# Patient Record
Sex: FEMALE | Race: OTHER | NOT HISPANIC OR LATINO | URBAN - METROPOLITAN AREA
[De-identification: names, ages, dates, MRNs, and addresses within clinical notes are randomized per-mention and may not be internally consistent; named-entity substitution may affect disease eponyms.]

---

## 2017-04-06 ENCOUNTER — OUTPATIENT (OUTPATIENT)
Dept: OUTPATIENT SERVICES | Facility: HOSPITAL | Age: 27
LOS: 1 days | Discharge: HOME | End: 2017-04-06
Payer: COMMERCIAL

## 2017-04-06 ENCOUNTER — APPOINTMENT (OUTPATIENT)
Dept: MRI IMAGING | Facility: HOSPITAL | Age: 27
End: 2017-04-06

## 2017-04-06 DIAGNOSIS — R22.41 LOCALIZED SWELLING, MASS AND LUMP, RIGHT LOWER LIMB: ICD-10-CM

## 2017-04-06 PROCEDURE — 73720 MRI LWR EXTREMITY W/O&W/DYE: CPT | Mod: 26,RT

## 2017-04-11 ENCOUNTER — RESULT REVIEW (OUTPATIENT)
Age: 27
End: 2017-04-11

## 2019-04-25 ENCOUNTER — EMERGENCY (EMERGENCY)
Facility: HOSPITAL | Age: 29
LOS: 0 days | Discharge: HOME | End: 2019-04-25
Payer: COMMERCIAL

## 2019-04-25 VITALS
DIASTOLIC BLOOD PRESSURE: 86 MMHG | RESPIRATION RATE: 18 BRPM | WEIGHT: 195.99 LBS | OXYGEN SATURATION: 99 % | SYSTOLIC BLOOD PRESSURE: 130 MMHG | HEIGHT: 61 IN | TEMPERATURE: 98 F | HEART RATE: 97 BPM

## 2019-04-25 DIAGNOSIS — N93.8 OTHER SPECIFIED ABNORMAL UTERINE AND VAGINAL BLEEDING: ICD-10-CM

## 2019-04-25 DIAGNOSIS — Z3A.01 LESS THAN 8 WEEKS GESTATION OF PREGNANCY: ICD-10-CM

## 2019-04-25 DIAGNOSIS — O20.0 THREATENED ABORTION: ICD-10-CM

## 2019-04-25 DIAGNOSIS — Z88.0 ALLERGY STATUS TO PENICILLIN: ICD-10-CM

## 2019-04-25 LAB
ALBUMIN SERPL ELPH-MCNC: 3.5 G/DL — SIGNIFICANT CHANGE UP (ref 3.3–5)
ALP SERPL-CCNC: 88 U/L — SIGNIFICANT CHANGE UP (ref 40–120)
ALT FLD-CCNC: 50 U/L — SIGNIFICANT CHANGE UP (ref 12–78)
ANION GAP SERPL CALC-SCNC: 8 MMOL/L — SIGNIFICANT CHANGE UP (ref 5–17)
APPEARANCE UR: CLEAR — SIGNIFICANT CHANGE UP
APTT BLD: 33 SEC — SIGNIFICANT CHANGE UP (ref 27.5–36.3)
AST SERPL-CCNC: 18 U/L — SIGNIFICANT CHANGE UP (ref 15–37)
BACTERIA # UR AUTO: ABNORMAL
BILIRUB SERPL-MCNC: 0.1 MG/DL — LOW (ref 0.2–1.2)
BILIRUB UR-MCNC: NEGATIVE — SIGNIFICANT CHANGE UP
BUN SERPL-MCNC: 10 MG/DL — SIGNIFICANT CHANGE UP (ref 7–23)
CALCIUM SERPL-MCNC: 9.5 MG/DL — SIGNIFICANT CHANGE UP (ref 8.5–10.1)
CHLORIDE SERPL-SCNC: 107 MMOL/L — SIGNIFICANT CHANGE UP (ref 96–108)
CO2 SERPL-SCNC: 23 MMOL/L — SIGNIFICANT CHANGE UP (ref 22–31)
COLOR SPEC: YELLOW — SIGNIFICANT CHANGE UP
CREAT SERPL-MCNC: 0.64 MG/DL — SIGNIFICANT CHANGE UP (ref 0.5–1.3)
DIFF PNL FLD: NEGATIVE — SIGNIFICANT CHANGE UP
EPI CELLS # UR: SIGNIFICANT CHANGE UP
GLUCOSE SERPL-MCNC: 83 MG/DL — SIGNIFICANT CHANGE UP (ref 70–99)
GLUCOSE UR QL: NEGATIVE MG/DL — SIGNIFICANT CHANGE UP
HCG SERPL-ACNC: HIGH MIU/ML
HCT VFR BLD CALC: 40 % — SIGNIFICANT CHANGE UP (ref 34.5–45)
HGB BLD-MCNC: 13.2 G/DL — SIGNIFICANT CHANGE UP (ref 11.5–15.5)
INR BLD: 1.04 RATIO — SIGNIFICANT CHANGE UP (ref 0.88–1.16)
KETONES UR-MCNC: NEGATIVE — SIGNIFICANT CHANGE UP
LEUKOCYTE ESTERASE UR-ACNC: NEGATIVE — SIGNIFICANT CHANGE UP
LIDOCAIN IGE QN: 106 U/L — SIGNIFICANT CHANGE UP (ref 73–393)
MCHC RBC-ENTMCNC: 29 PG — SIGNIFICANT CHANGE UP (ref 27–34)
MCHC RBC-ENTMCNC: 33 GM/DL — SIGNIFICANT CHANGE UP (ref 32–36)
MCV RBC AUTO: 87.9 FL — SIGNIFICANT CHANGE UP (ref 80–100)
NITRITE UR-MCNC: NEGATIVE — SIGNIFICANT CHANGE UP
NRBC # BLD: 0 /100 WBCS — SIGNIFICANT CHANGE UP (ref 0–0)
PH UR: 6 — SIGNIFICANT CHANGE UP (ref 5–8)
PLATELET # BLD AUTO: 310 K/UL — SIGNIFICANT CHANGE UP (ref 150–400)
POTASSIUM SERPL-MCNC: 3.5 MMOL/L — SIGNIFICANT CHANGE UP (ref 3.5–5.3)
POTASSIUM SERPL-SCNC: 3.5 MMOL/L — SIGNIFICANT CHANGE UP (ref 3.5–5.3)
PROT SERPL-MCNC: 7.4 GM/DL — SIGNIFICANT CHANGE UP (ref 6–8.3)
PROT UR-MCNC: NEGATIVE MG/DL — SIGNIFICANT CHANGE UP
PROTHROM AB SERPL-ACNC: 11.7 SEC — SIGNIFICANT CHANGE UP (ref 10–12.9)
RBC # BLD: 4.55 M/UL — SIGNIFICANT CHANGE UP (ref 3.8–5.2)
RBC # FLD: 13.5 % — SIGNIFICANT CHANGE UP (ref 10.3–14.5)
SODIUM SERPL-SCNC: 138 MMOL/L — SIGNIFICANT CHANGE UP (ref 135–145)
SP GR SPEC: 1.01 — SIGNIFICANT CHANGE UP (ref 1.01–1.02)
UROBILINOGEN FLD QL: NEGATIVE MG/DL — SIGNIFICANT CHANGE UP
WBC # BLD: 15.17 K/UL — HIGH (ref 3.8–10.5)
WBC # FLD AUTO: 15.17 K/UL — HIGH (ref 3.8–10.5)
WBC UR QL: SIGNIFICANT CHANGE UP

## 2019-04-25 PROCEDURE — 76856 US EXAM PELVIC COMPLETE: CPT | Mod: 26

## 2019-04-25 PROCEDURE — 99284 EMERGENCY DEPT VISIT MOD MDM: CPT

## 2019-04-25 NOTE — ED ADULT NURSE NOTE - CHPI ED NUR SYMPTOMS NEG
no back pain/no vomiting/no coffee grounds emesis/no vaginal discharge/no discharge/no abdominal pain/no fever/no nausea

## 2019-04-25 NOTE — ED PROVIDER NOTE - OBJECTIVE STATEMENT
this is a 30 yo F lmp 19 ago  c/o pelvis pain and vaginal spotting x 2 days Pt is prenatal medication

## 2019-04-25 NOTE — ED ADULT TRIAGE NOTE - CHIEF COMPLAINT QUOTE
c/o lower abdominal cramping x 3 weeks vaginal spotting noted x 1 episode last week and again x 1 today 8 weeks pregnant a0

## 2019-04-26 LAB
CULTURE RESULTS: SIGNIFICANT CHANGE UP
SPECIMEN SOURCE: SIGNIFICANT CHANGE UP

## 2019-05-30 ENCOUNTER — APPOINTMENT (OUTPATIENT)
Dept: ANTEPARTUM | Facility: CLINIC | Age: 29
End: 2019-05-30
Payer: COMMERCIAL

## 2019-05-30 ENCOUNTER — ASOB RESULT (OUTPATIENT)
Age: 29
End: 2019-05-30

## 2019-05-30 ENCOUNTER — APPOINTMENT (OUTPATIENT)
Dept: MATERNAL FETAL MEDICINE | Facility: CLINIC | Age: 29
End: 2019-05-30
Payer: COMMERCIAL

## 2019-05-30 PROCEDURE — 76813 OB US NUCHAL MEAS 1 GEST: CPT

## 2019-05-30 PROCEDURE — 99241 OFFICE CONSULTATION NEW/ESTAB PATIENT 15 MIN: CPT

## 2019-05-30 PROCEDURE — 36416 COLLJ CAPILLARY BLOOD SPEC: CPT

## 2019-06-12 LAB
1ST TRIMESTER DATA: NORMAL
ADDENDUM DOC: NORMAL
AFP PNL SERPL: NORMAL
AFP SERPL-ACNC: NORMAL
CLINICAL BIOCHEMIST REVIEW: NORMAL
FREE BETA HCG 1ST TRIMESTER: NORMAL
Lab: NORMAL
NOTES NTD: NORMAL
NT: NORMAL
PAPP-A SERPL-ACNC: NORMAL
TRISOMY 18/3: NORMAL

## 2019-07-02 ENCOUNTER — OUTPATIENT (OUTPATIENT)
Dept: OUTPATIENT SERVICES | Facility: HOSPITAL | Age: 29
LOS: 1 days | Discharge: HOME | End: 2019-07-02
Payer: COMMERCIAL

## 2019-07-02 DIAGNOSIS — I82.401 ACUTE EMBOLISM AND THROMBOSIS OF UNSPECIFIED DEEP VEINS OF RIGHT LOWER EXTREMITY: ICD-10-CM

## 2019-07-02 PROCEDURE — 93971 EXTREMITY STUDY: CPT | Mod: 26,RT

## 2019-07-19 ENCOUNTER — EMERGENCY (EMERGENCY)
Facility: HOSPITAL | Age: 29
LOS: 1 days | Discharge: HOME | End: 2019-07-19
Attending: EMERGENCY MEDICINE | Admitting: EMERGENCY MEDICINE
Payer: SELF-PAY

## 2019-07-19 VITALS
DIASTOLIC BLOOD PRESSURE: 90 MMHG | TEMPERATURE: 98 F | OXYGEN SATURATION: 100 % | HEART RATE: 94 BPM | SYSTOLIC BLOOD PRESSURE: 136 MMHG | RESPIRATION RATE: 18 BRPM

## 2019-07-19 LAB
ALBUMIN SERPL ELPH-MCNC: 3.7 G/DL — SIGNIFICANT CHANGE UP (ref 3.3–5)
ALP SERPL-CCNC: 88 U/L — SIGNIFICANT CHANGE UP (ref 40–120)
ALT FLD-CCNC: 26 U/L — SIGNIFICANT CHANGE UP (ref 4–33)
ANION GAP SERPL CALC-SCNC: 10 MMO/L — SIGNIFICANT CHANGE UP (ref 7–14)
APTT BLD: 29.3 SEC — SIGNIFICANT CHANGE UP (ref 27.5–36.3)
AST SERPL-CCNC: 15 U/L — SIGNIFICANT CHANGE UP (ref 4–32)
BASE EXCESS BLDV CALC-SCNC: -1.5 MMOL/L — SIGNIFICANT CHANGE UP
BASOPHILS # BLD AUTO: 0.03 K/UL — SIGNIFICANT CHANGE UP (ref 0–0.2)
BASOPHILS NFR BLD AUTO: 0.2 % — SIGNIFICANT CHANGE UP (ref 0–2)
BILIRUB SERPL-MCNC: < 0.2 MG/DL — LOW (ref 0.2–1.2)
BLOOD GAS VENOUS - CREATININE: 0.53 MG/DL — SIGNIFICANT CHANGE UP (ref 0.5–1.3)
BUN SERPL-MCNC: 9 MG/DL — SIGNIFICANT CHANGE UP (ref 7–23)
CALCIUM SERPL-MCNC: 9.3 MG/DL — SIGNIFICANT CHANGE UP (ref 8.4–10.5)
CHLORIDE BLDV-SCNC: 109 MMOL/L — HIGH (ref 96–108)
CHLORIDE SERPL-SCNC: 105 MMOL/L — SIGNIFICANT CHANGE UP (ref 98–107)
CO2 SERPL-SCNC: 23 MMOL/L — SIGNIFICANT CHANGE UP (ref 22–31)
CREAT SERPL-MCNC: 0.5 MG/DL — SIGNIFICANT CHANGE UP (ref 0.5–1.3)
EOSINOPHIL # BLD AUTO: 0.15 K/UL — SIGNIFICANT CHANGE UP (ref 0–0.5)
EOSINOPHIL NFR BLD AUTO: 1.1 % — SIGNIFICANT CHANGE UP (ref 0–6)
GAS PNL BLDV: 136 MMOL/L — SIGNIFICANT CHANGE UP (ref 136–146)
GLUCOSE BLDV-MCNC: 82 MG/DL — SIGNIFICANT CHANGE UP (ref 70–99)
GLUCOSE SERPL-MCNC: 84 MG/DL — SIGNIFICANT CHANGE UP (ref 70–99)
HCO3 BLDV-SCNC: 23 MMOL/L — SIGNIFICANT CHANGE UP (ref 20–27)
HCT VFR BLD CALC: 38 % — SIGNIFICANT CHANGE UP (ref 34.5–45)
HCT VFR BLDV CALC: 40.1 % — SIGNIFICANT CHANGE UP (ref 34.5–45)
HGB BLD-MCNC: 12.7 G/DL — SIGNIFICANT CHANGE UP (ref 11.5–15.5)
HGB BLDV-MCNC: 13 G/DL — SIGNIFICANT CHANGE UP (ref 11.5–15.5)
IMM GRANULOCYTES NFR BLD AUTO: 0.5 % — SIGNIFICANT CHANGE UP (ref 0–1.5)
INR BLD: 0.97 — SIGNIFICANT CHANGE UP (ref 0.88–1.17)
LACTATE BLDV-MCNC: 0.9 MMOL/L — SIGNIFICANT CHANGE UP (ref 0.5–2)
LYMPHOCYTES # BLD AUTO: 2.96 K/UL — SIGNIFICANT CHANGE UP (ref 1–3.3)
LYMPHOCYTES # BLD AUTO: 22.2 % — SIGNIFICANT CHANGE UP (ref 13–44)
MCHC RBC-ENTMCNC: 30.1 PG — SIGNIFICANT CHANGE UP (ref 27–34)
MCHC RBC-ENTMCNC: 33.4 % — SIGNIFICANT CHANGE UP (ref 32–36)
MCV RBC AUTO: 90 FL — SIGNIFICANT CHANGE UP (ref 80–100)
MONOCYTES # BLD AUTO: 0.7 K/UL — SIGNIFICANT CHANGE UP (ref 0–0.9)
MONOCYTES NFR BLD AUTO: 5.3 % — SIGNIFICANT CHANGE UP (ref 2–14)
NEUTROPHILS # BLD AUTO: 9.4 K/UL — HIGH (ref 1.8–7.4)
NEUTROPHILS NFR BLD AUTO: 70.7 % — SIGNIFICANT CHANGE UP (ref 43–77)
NRBC # FLD: 0 K/UL — SIGNIFICANT CHANGE UP (ref 0–0)
PCO2 BLDV: 38 MMHG — LOW (ref 41–51)
PH BLDV: 7.39 PH — SIGNIFICANT CHANGE UP (ref 7.32–7.43)
PLATELET # BLD AUTO: 276 K/UL — SIGNIFICANT CHANGE UP (ref 150–400)
PMV BLD: 9 FL — SIGNIFICANT CHANGE UP (ref 7–13)
PO2 BLDV: 39 MMHG — SIGNIFICANT CHANGE UP (ref 35–40)
POTASSIUM BLDV-SCNC: 3.5 MMOL/L — SIGNIFICANT CHANGE UP (ref 3.4–4.5)
POTASSIUM SERPL-MCNC: 3.6 MMOL/L — SIGNIFICANT CHANGE UP (ref 3.5–5.3)
POTASSIUM SERPL-SCNC: 3.6 MMOL/L — SIGNIFICANT CHANGE UP (ref 3.5–5.3)
PROT SERPL-MCNC: 6.6 G/DL — SIGNIFICANT CHANGE UP (ref 6–8.3)
PROTHROM AB SERPL-ACNC: 11 SEC — SIGNIFICANT CHANGE UP (ref 9.8–13.1)
RBC # BLD: 4.22 M/UL — SIGNIFICANT CHANGE UP (ref 3.8–5.2)
RBC # FLD: 14.2 % — SIGNIFICANT CHANGE UP (ref 10.3–14.5)
SAO2 % BLDV: 67.6 % — SIGNIFICANT CHANGE UP (ref 60–85)
SODIUM SERPL-SCNC: 138 MMOL/L — SIGNIFICANT CHANGE UP (ref 135–145)
WBC # BLD: 13.31 K/UL — HIGH (ref 3.8–10.5)
WBC # FLD AUTO: 13.31 K/UL — HIGH (ref 3.8–10.5)

## 2019-07-19 PROCEDURE — 99284 EMERGENCY DEPT VISIT MOD MDM: CPT

## 2019-07-19 PROCEDURE — 93971 EXTREMITY STUDY: CPT | Mod: 26,LT

## 2019-07-19 RX ORDER — ACETAMINOPHEN 500 MG
650 TABLET ORAL ONCE
Refills: 0 | Status: COMPLETED | OUTPATIENT
Start: 2019-07-19 | End: 2019-07-19

## 2019-07-19 NOTE — ED PROVIDER NOTE - PROGRESS NOTE DETAILS
Patient was offered an xray as well to rule of stress fracture, risk/benefits were discussed. Pt discussed with co-worker who is in medical field and declined xray at this time. Will continue with labs/US r/o DVT Results of US negative for dvt. Will tx for cellulitis/lymphangitis. Results reviewed and discussed with plan and all question and concerns addressed. Pt has allergy to penicillin. Will dc on clinda. Pt to f/u with [facility] 1-2 days and GYN.

## 2019-07-19 NOTE — ED PROVIDER NOTE - NSFOLLOWUPINSTRUCTIONS_ED_ALL_ED_FT
Patient advised to follow up with PRIMARY CARE DOCTOR IN 1-2 DAYS AND GYNECOLOGIST   and told to return to the emergency department immediately for any new or concerning symptoms such as fevers, chills, worsening redness  OR ANY OTHER COMPLAINTS. Patient agrees with plan.    Take antibiotic as directed   Rest, keep elevated   Apply warm compress to area   Follow up with specialist

## 2019-07-19 NOTE — ED PROVIDER NOTE - ATTENDING CONTRIBUTION TO CARE
29 year old female c/o gradual right foot and leg swelling x few days. PE: NAD, CV RRR, lungs clear, RLE + mild from foot to mid leg swelling, neurovascularly intact. I&P: 29 year old female c/o gradual right foot and leg swelling x few days. PE: NAD, CV RRR, lungs clear, RLE + mild from foot to mid leg swelling, neurovascularly intact. I&P: labs and US unremarkable, peripheral edema, OB/GYN follow up

## 2019-07-19 NOTE — ED PROVIDER NOTE - CARE PLAN
Principal Discharge DX:	Cellulitis  Assessment and plan of treatment:	Patient advised to follow up with PRIMARY CARE DOCTOR IN 1-2 DAYS AND GYNECOLOGIST   and told to return to the emergency department immediately for any new or concerning symptoms such as fevers, chills, worsening redness  OR ANY OTHER COMPLAINTS. Patient agrees with plan.    Take antibiotic as directed   Rest, keep elevated   Apply warm compress to area   Follow up with specialist  Secondary Diagnosis:	Lymphangitis

## 2019-07-19 NOTE — ED PROVIDER NOTE - PHYSICAL EXAMINATION
Vital signs reviewed.   CONSTITUTIONAL: Well-appearing; well-nourished; in no apparent distress. Non-toxic appearing.   HEAD: Normocephalic, atraumatic.  EYES: PERRL, EOM intact, conjunctiva and sclera WNL.  ENT: normal nose; no rhinorrhea;   CARD: Normal S1, S2; no murmurs, rubs, or gallops noted.  RESP: Normal chest excursion with respiration; breath sounds clear and equal bilaterally; no wheezes, rhonchi, or rales.  ABD/GI: soft, non-distended; non-tender;   EXT/MS: moves all extremities; distal pulses are normal, +swelling noted to Rt foot. +TTP over dorsum of foot. +TTP to Rt calf posteriorly. Mild erythema noted to dorsum of foot and some streaking noted to calf medially. Not fluctuant, no signs of cellulitis appreciated. No ecchymosis noted.   SKIN: Normal for age and race; warm; dry; good turgor; See ext section.   NEURO: Awake, alert, oriented x 3, no gross deficits,  no motor or sensory deficit noted. 5/5 strength Noted.   PSYCH: Normal mood; appropriate affect.

## 2019-07-19 NOTE — ED ADULT TRIAGE NOTE - CHIEF COMPLAINT QUOTE
Pt is 20 weeks pregnant, c/o swelling to right foot for the past 2 days, with pain starting yesterday, as well as a faint pink line extending from ankle to shin and to groin. Denies travel. Denies abd pain or other OB complaints.

## 2019-07-19 NOTE — ED PROVIDER NOTE - CLINICAL SUMMARY MEDICAL DECISION MAKING FREE TEXT BOX
Patient is a 29 y.o female with no significant PMHx who presents to ED c/o RLE foot pain and swelling x 2-3 days. DDx- includes but not limited to; r/o thrombophlebitis. Plan- labs, Doppler, will monitor and reassess

## 2019-07-19 NOTE — ED PROVIDER NOTE - OBJECTIVE STATEMENT
HPI: Patient is a 29 y.o female with no significant PMHx who presents to ED c/o RLE foot pain and swelling x 2-3 days. As per patient states about 3 days ago began to notice swelling of Rt foot with some pain with ambulating. Patient then noticed today area of redness to dorsum of foot and noticed some streaking up to calf and toward thigh. Admits to pain to foot and calf, pain exacerbated with ambulating. Admits to taking tylenol this morning with minimal relief. Pt admits she had similar swelling of foot 2 weeks ago. Denies known insect bites or injuries/wounds, denies weakness, recent travel, hx of DVT, SOB, CP, weakness, decreased rom, ankle pain, trauma/falls, fevers/chills or any other complaints. Denies hx of smoking. Denies ob complaints. No vaginal bleeding, abdominal pain, dysuria, n/v/d, back pain or any other complaints.

## 2019-07-23 ENCOUNTER — INPATIENT (INPATIENT)
Facility: HOSPITAL | Age: 29
LOS: 5 days | Discharge: HOME | End: 2019-07-29
Attending: INTERNAL MEDICINE | Admitting: INTERNAL MEDICINE
Payer: COMMERCIAL

## 2019-07-23 VITALS
DIASTOLIC BLOOD PRESSURE: 80 MMHG | RESPIRATION RATE: 17 BRPM | TEMPERATURE: 98 F | OXYGEN SATURATION: 100 % | WEIGHT: 205.91 LBS | HEART RATE: 115 BPM | SYSTOLIC BLOOD PRESSURE: 150 MMHG

## 2019-07-23 LAB
ALBUMIN SERPL ELPH-MCNC: 3.6 G/DL — SIGNIFICANT CHANGE UP (ref 3.3–5)
ALP SERPL-CCNC: 84 U/L — SIGNIFICANT CHANGE UP (ref 40–120)
ALT FLD-CCNC: 19 U/L — SIGNIFICANT CHANGE UP (ref 4–33)
ANION GAP SERPL CALC-SCNC: 10 MMO/L — SIGNIFICANT CHANGE UP (ref 7–14)
AST SERPL-CCNC: 14 U/L — SIGNIFICANT CHANGE UP (ref 4–32)
BASOPHILS # BLD AUTO: 0.04 K/UL — SIGNIFICANT CHANGE UP (ref 0–0.2)
BASOPHILS NFR BLD AUTO: 0.3 % — SIGNIFICANT CHANGE UP (ref 0–2)
BILIRUB SERPL-MCNC: < 0.2 MG/DL — LOW (ref 0.2–1.2)
BUN SERPL-MCNC: 9 MG/DL — SIGNIFICANT CHANGE UP (ref 7–23)
CALCIUM SERPL-MCNC: 9 MG/DL — SIGNIFICANT CHANGE UP (ref 8.4–10.5)
CHLORIDE SERPL-SCNC: 107 MMOL/L — SIGNIFICANT CHANGE UP (ref 98–107)
CO2 SERPL-SCNC: 20 MMOL/L — LOW (ref 22–31)
CREAT SERPL-MCNC: 0.49 MG/DL — LOW (ref 0.5–1.3)
EOSINOPHIL # BLD AUTO: 0.08 K/UL — SIGNIFICANT CHANGE UP (ref 0–0.5)
EOSINOPHIL NFR BLD AUTO: 0.6 % — SIGNIFICANT CHANGE UP (ref 0–6)
GLUCOSE SERPL-MCNC: 82 MG/DL — SIGNIFICANT CHANGE UP (ref 70–99)
HCT VFR BLD CALC: 40.4 % — SIGNIFICANT CHANGE UP (ref 34.5–45)
HGB BLD-MCNC: 13.1 G/DL — SIGNIFICANT CHANGE UP (ref 11.5–15.5)
IMM GRANULOCYTES NFR BLD AUTO: 0.5 % — SIGNIFICANT CHANGE UP (ref 0–1.5)
LYMPHOCYTES # BLD AUTO: 1.99 K/UL — SIGNIFICANT CHANGE UP (ref 1–3.3)
LYMPHOCYTES # BLD AUTO: 14.1 % — SIGNIFICANT CHANGE UP (ref 13–44)
MCHC RBC-ENTMCNC: 28.8 PG — SIGNIFICANT CHANGE UP (ref 27–34)
MCHC RBC-ENTMCNC: 32.4 % — SIGNIFICANT CHANGE UP (ref 32–36)
MCV RBC AUTO: 88.8 FL — SIGNIFICANT CHANGE UP (ref 80–100)
MONOCYTES # BLD AUTO: 0.5 K/UL — SIGNIFICANT CHANGE UP (ref 0–0.9)
MONOCYTES NFR BLD AUTO: 3.5 % — SIGNIFICANT CHANGE UP (ref 2–14)
NEUTROPHILS # BLD AUTO: 11.47 K/UL — HIGH (ref 1.8–7.4)
NEUTROPHILS NFR BLD AUTO: 81 % — HIGH (ref 43–77)
NRBC # FLD: 0 K/UL — SIGNIFICANT CHANGE UP (ref 0–0)
PLATELET # BLD AUTO: 263 K/UL — SIGNIFICANT CHANGE UP (ref 150–400)
PMV BLD: 9 FL — SIGNIFICANT CHANGE UP (ref 7–13)
POTASSIUM SERPL-MCNC: 3.8 MMOL/L — SIGNIFICANT CHANGE UP (ref 3.5–5.3)
POTASSIUM SERPL-SCNC: 3.8 MMOL/L — SIGNIFICANT CHANGE UP (ref 3.5–5.3)
PROT SERPL-MCNC: 6.9 G/DL — SIGNIFICANT CHANGE UP (ref 6–8.3)
RBC # BLD: 4.55 M/UL — SIGNIFICANT CHANGE UP (ref 3.8–5.2)
RBC # FLD: 14.4 % — SIGNIFICANT CHANGE UP (ref 10.3–14.5)
SODIUM SERPL-SCNC: 137 MMOL/L — SIGNIFICANT CHANGE UP (ref 135–145)
WBC # BLD: 14.15 K/UL — HIGH (ref 3.8–10.5)
WBC # FLD AUTO: 14.15 K/UL — HIGH (ref 3.8–10.5)

## 2019-07-23 RX ORDER — SODIUM CHLORIDE 9 MG/ML
1000 INJECTION INTRAMUSCULAR; INTRAVENOUS; SUBCUTANEOUS ONCE
Refills: 0 | Status: COMPLETED | OUTPATIENT
Start: 2019-07-23 | End: 2019-07-23

## 2019-07-23 RX ORDER — DAPTOMYCIN 500 MG/10ML
373 INJECTION, POWDER, LYOPHILIZED, FOR SOLUTION INTRAVENOUS EVERY 24 HOURS
Refills: 0 | Status: COMPLETED | OUTPATIENT
Start: 2019-07-23 | End: 2019-07-24

## 2019-07-23 RX ORDER — ACETAMINOPHEN 500 MG
975 TABLET ORAL ONCE
Refills: 0 | Status: COMPLETED | OUTPATIENT
Start: 2019-07-23 | End: 2019-07-23

## 2019-07-23 RX ORDER — VANCOMYCIN HCL 1 G
1000 VIAL (EA) INTRAVENOUS EVERY 12 HOURS
Refills: 0 | Status: DISCONTINUED | OUTPATIENT
Start: 2019-07-23 | End: 2019-07-23

## 2019-07-23 RX ADMIN — Medication 250 MILLIGRAM(S): at 10:17

## 2019-07-23 RX ADMIN — Medication 975 MILLIGRAM(S): at 18:38

## 2019-07-23 RX ADMIN — SODIUM CHLORIDE 1000 MILLILITER(S): 9 INJECTION INTRAMUSCULAR; INTRAVENOUS; SUBCUTANEOUS at 09:20

## 2019-07-23 RX ADMIN — Medication 975 MILLIGRAM(S): at 18:08

## 2019-07-23 RX ADMIN — DAPTOMYCIN 114.92 MILLIGRAM(S): 500 INJECTION, POWDER, LYOPHILIZED, FOR SOLUTION INTRAVENOUS at 19:28

## 2019-07-23 RX ADMIN — DAPTOMYCIN 373 MILLIGRAM(S): 500 INJECTION, POWDER, LYOPHILIZED, FOR SOLUTION INTRAVENOUS at 20:00

## 2019-07-23 NOTE — ED ADULT NURSE NOTE - OBJECTIVE STATEMENT
Pt presenting to intake AxOx4 ambulatory at baseline with c.o redness and warmth to right leg. Pt 20 weeks pregnant. Pt states she was discharged last Friday, started on abx for what started just as pain in RLE without redness. Pt states yesterday she developed warmth and redness to right leg, extending from ankle to hip. Pt denies CP, SOB, dizziness, lightheadedness, fever, body aches, palpitations. Right leg appears warm to touch on site of redness, pt c/o worsening pain with ambulation. Pt noted to be tachycardic at 111. BP stable, pt's breathing is even and unlabored. IV established with 20g in RAC, labs drawn and sent, MD at bedside, will continue to monitor.

## 2019-07-23 NOTE — ED ADULT TRIAGE NOTE - CHIEF COMPLAINT QUOTE
20 w preg. recently treated for a Right leg infection, states wound pattern is extending upward and warm to touch.

## 2019-07-23 NOTE — ED CDU PROVIDER INITIAL DAY NOTE - GASTROINTESTINAL, MLM
Abdomen soft, non-tender, no rebound, no guarding. Gravid. Fundus palpable to the level of the umbilicus.

## 2019-07-23 NOTE — ED CDU PROVIDER INITIAL DAY NOTE - PHYSICAL EXAMINATION
Raised erythematous, linear streak noted from the medial shin into the medial thigh, +tenderness and warmth on palpation, no surrounding erythema

## 2019-07-23 NOTE — CONSULT NOTE ADULT - ASSESSMENT
28 yo female presents with pregnancy and right leg lymphangitis   - pt was seen and evaluated   - vitals are stable, WBC 14  - there is no signs of infection to the right foot, ingrown resolved. Noted right leg lymphangitis   - continue IV abx and follow ID recommendations for abx   - see with attending

## 2019-07-23 NOTE — ED PROVIDER NOTE - OBJECTIVE STATEMENT
28 y/o f with no PMHx presents to the ED c/o pain and redness on right leg x4 days. Pt states she came x4 days ago for the same thing and was told due to high WBC she has an infection and was given clindamycin and has been on it x4 days. Pt states pain is worsening so she came in. Pt is pregnant  confirmed with Ultrasound. Pt has taken Tylenol with no relief. Allergic to penicillin and Robitussin. Denies fever. No other acute complaints at time of eval. 30 y/o f with no PMHx presents to the ED c/o pain and redness on right leg x4 days. Pt states she came x4 days ago for the same thing and was told due to high WBC she has an infection and was given clindamycin and has been on it x4 days.  Pt states pain is worsening so she came in. Pt is pregnant  confirmed with Ultrasound. Pt has taken Tylenol with no relief. Allergic to penicillin and Robitussin. Denies fever. No other acute complaints at time of eval.

## 2019-07-23 NOTE — ED PROVIDER NOTE - LOWER EXTREMITY EXAM, RIGHT
TENDERNESS/Redness, edema, warm to touch, and TTP. Redness, edema, warm to touch, and TTP medial right LE/TENDERNESS

## 2019-07-23 NOTE — ED PROVIDER NOTE - NEUROLOGICAL, MLM
Alert and oriented, no focal deficits, no motor or sensory deficits. Sensation intact. Motor strength 5/5. DP pulses present.

## 2019-07-23 NOTE — ED CDU PROVIDER INITIAL DAY NOTE - OBJECTIVE STATEMENT
28 y/o F no PMHx, , currently 20 weeks pregnant, here c/o red streaking to the R leg x 4 days. Pt was seen in the ED 4 days ago, sent home on Clindamycin 300mg TID. Pt states she noticed complete resolution of the redness 2 days into the abx, however redness recurred yesterday. Of note, pt did have an ingrown toe nail to the R foot, which she had gotten a pedicure for, prior to onset of sx. No pregnancy complaints i.e. denies vaginal bleeding, abdominal pain. No fevers or chills. Pt sent to CDU for ID evaluation and abx. Per ID, recommend podiatry eval as well.

## 2019-07-23 NOTE — ED CDU PROVIDER INITIAL DAY NOTE - PROGRESS NOTE DETAILS
ryan abreu: pt resting comfortably at bedside, getting abx, pt seen by ID, will give abx, reasses, pt denies any pain in leg, any worsening of rash

## 2019-07-23 NOTE — ED CDU PROVIDER INITIAL DAY NOTE - ATTENDING CONTRIBUTION TO CARE
Dr. Parham:  I performed a face to face bedside interview with patient regarding history of present illness, review of symptoms and past medical history. I completed an independent physical exam.  I have discussed patient's plan of care with PA.   I agree with note as stated above, having amended the EMR as needed to reflect my findings.   This includes HISTORY OF PRESENT ILLNESS, HIV, PAST MEDICAL/SURGICAL/FAMILY/SOCIAL HISTORY, ALLERGIES AND HOME MEDICATIONS, REVIEW OF SYSTEMS, PHYSICAL EXAM, and any PROGRESS NOTES during the time I functioned as the attending physician for this patient.    29F at 20wks gestation presents with worsening redness/pain to RLE.  Was diagnosed with mild cellulitis 4 days ago and started on Clindamycin 300mg q8h.  States redness initially seemed to have been improving but significantly worsened last night.  Denies fevers and other constitutional symptoms.    Exam:  - nad  - rrr  - ctab  - abd soft ntnd  - +streak of erythema extending from right medial calf up to upper medial thigh, warm and tender to touch, +associated LLE edema    A/P  - lymphangitis  - IV abx  - infectious disease consult
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
## 2019-07-23 NOTE — CONSULT NOTE ADULT - ASSESSMENT
28 yo woman, who is 20 weeks pregnant with cellulitis right foot probably related to ingrown toenail with lymphangitic spread right leg.  No signs of systemic toxicity.  Leucocytosis.  h/o allergy to penicillin.  reports anaphylaxis as infant.    Options for empiric antibiotics limited by pregnancy and antibiotic allergy.    culture blood testing.    suggest;  podiatry evaluation of right  toe with wound culture if indicated  start Daptomycin 4 mg/kg IV daily  (category B risk in pregnancy)  allergy consult for testing cephalosporin    Ama Kee MD  Pager: [phone]  After 5 PM or weekends please call fellow on call or office [phone]

## 2019-07-23 NOTE — CONSULT NOTE ADULT - SUBJECTIVE AND OBJECTIVE BOX
HPI:  28 yo woman 20 weeks pregnant with right leg pain, redness x several days.  Presented to ER 7/19 with pain and swelling over foot and given clindamycin. Initially she felt better, however yesterday noted red streak right leg (lower leg to groin) and presented to ER after speaking with her podiatrist.    Has ingrown toenail right toe #1 which she trimmed recently.  Has pedicures.  No fever chills.  Not diabetic.      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    FAMILY HISTORY:  No pertinent family history in first degree relatives      REVIEW OF SYSTEMS  [  ] ROS unobtainable because:    [  ] All other systems negative except as noted below:	    Constitutional:  [ ] fever [ ] chills  [ ] weight loss  [ ] weakness  Skin:  [ ] rash [ ] phlebitis  redness, right leg	  Eyes: [ ] icterus [ ] pain  [ ] discharge	  ENMT: [ ] sore throat  [ ] thrush [ ] ulcers [ ] exudates  Respiratory: [ ] dyspnea [ ] hemoptysis [ ] cough [ ] sputum	  Cardiovascular:  [ ] chest pain [ ] palpitations [ ] edema	  Gastrointestinal:  [ ] nausea [ ] vomiting [ ] diarrhea [ ] constipation [ ] pain	  Genitourinary:  [ ] dysuria [ ] frequency [ ] hematuria [ ] discharge [ ] flank pain  [ ] incontinence  Musculoskeletal:  [ ] myalgias [ ] arthralgias [ ] arthritis  [ ] back pain  Neurological:  [ ] headache [ ] seizures  [ ] confusion/altered mental status  Psychiatric:  [ ] anxiety [ ] depression	  Hematology/Lymphatics:  [ ] lymphadenopathy  Endocrine:  [ ] adrenal [ ] thyroid  Allergic/Immunologic:	 [ ] transplant [ ] seasonal    PHYSICAL EXAM:  General: [x ] non-toxic  HEAD/EYES: [ ] PERRL [ ] white sclera [x ] icterus  ENT:  [ ] normal [x ] supple [ ] thrush [ ] pharyngeal exudate  Cardiovascular:   S1S2  Respiratory:   clear to ausculation bilaterally  GI:   soft, non-tender, normal bowel sounds  :   no CVA tenderness   Musculoskeletal:   no synovitis  Neurologic:   non-focal exam   Skin:  right leg erythema streaking up leg, slight swelling dorsum foot, swelling lateral nail bed, no draiange  Psychiatric:  [x ] appropriate affect [x ] alert & oriented  Lines:  [ ] no phlebitis [ ] central line          Drug Dosing Weight  Height (cm): 154.94 (25 Apr 2019 18:52)  Weight (kg): 88.9 (25 Apr 2019 18:52)  BMI (kg/m2): 37 (25 Apr 2019 18:52)  BSA (m2): 1.87 (25 Apr 2019 18:52)    Vital Signs Last 24 Hrs  T(F): 98.1 (07-23-19 @ 11:01), Max: 98.3 (07-19-19 @ 19:00)    Vital Signs Last 24 Hrs  HR: 98 (07-23-19 @ 11:01) (98 - 115)  BP: 122/78 (07-23-19 @ 11:01) (122/78 - 150/80)  RR: 17 (07-23-19 @ 11:01)  SpO2: 100% (07-23-19 @ 11:01) (100% - 100%)  Wt(kg): --                          13.1   14.15 )-----------( 263      ( 23 Jul 2019 09:15 )             40.4       07-23    137  |  107  |  9   ----------------------------<  82  3.8   |  20<L>  |  0.49<L>    Ca    9.0      23 Jul 2019 09:15    TPro  6.9  /  Alb  3.6  /  TBili  < 0.2<L>  /  DBili  x   /  AST  14  /  ALT  19  /  AlkPhos  84  07-23          MICROBIOLOGY:    blood cultures testing        RADIOLOGY:    < from: US Duplex Venous Lower Ext Ltd, Right (07.23.19 @ 12:18) >    IMPRESSION:     No evidence of right lower extremity deep venous thrombosis.    < end of copied text >

## 2019-07-23 NOTE — ED PROVIDER NOTE - ATTENDING CONTRIBUTION TO CARE
Dr. Lock: 28 yo female with no sig PMH, currently 20 weeks pregnant, in ED with 4 days of streaking redness to right inner thigh/calf.  Was seen in ED for this on day that symptoms began, had negative duplex for DVT, placed on clinda and sent home.  At home symptoms initially improved and then returned worse than initially.  Streaking has moved from right medial ankle into medial thigh.  No fever/chills, CP/SOB, N/V/D or abdominal pain.  Pt continues to feel baby move at her baseline, no vaginal bleeding or abdominal pain.  On exam pt overall well appearing, in NAD, heart RRR, lungs CTAB, abd gravid and NTND, strength 5/5 in all extremities and skin without rash.  Right LE with streaking from medial ankle to medial thigh, with associated palpable cord at proximal end, and associated TTP.  .

## 2019-07-23 NOTE — CONSULT NOTE ADULT - SUBJECTIVE AND OBJECTIVE BOX
Podiatry pager #: [facility] [phone]/ [phone]    Patient is a 29y old  Female who presents with a chief complaint of right leg lymphangitis     HPI:  30 y/o f with no PMHx presents to the ED c/o pain and redness on right leg x4 days. Pt states she came x4 days ago for the same thing and was told due to high WBC she has an infection and was given clindamycin and has been on it x4 days.  Pt states pain is worsening so she came in. Pt is pregnant  confirmed with Ultrasound. Pt has taken Tylenol with no relief. Allergic to penicillin and Robitussin. Denies fever. No other acute complaints at time of eval.    PAST MEDICAL & SURGICAL HISTORY:  No pertinent past medical history  No pertinent past medical history  No significant past surgical history  No significant past surgical history      MEDICATIONS  (STANDING):    MEDICATIONS  (PRN):      Allergies    pcn (Anaphylaxis)  penicillin (Unknown)  Robitussin (Unknown)    Intolerances        VITALS:    Vital Signs Last 24 Hrs  T(C): 36.7 (2019 11:01), Max: 36.8 (2019 08:21)  T(F): 98.1 (2019 11:01), Max: 98.3 (2019 08:21)  HR: 98 (2019 11:01) (98 - 115)  BP: 122/78 (2019 11:01) (122/78 - 150/80)  BP(mean): --  RR: 17 (2019 11:01) (16 - 17)  SpO2: 100% (2019 11:01) (100% - 100%)    LABS:                          13.1   14.15 )-----------( 263      ( 2019 09:15 )             40.4       07-23    137  |  107  |  9   ----------------------------<  82  3.8   |  20<L>  |  0.49<L>    Ca    9.0      2019 09:15    TPro  6.9  /  Alb  3.6  /  TBili  < 0.2<L>  /  DBili  x   /  AST  14  /  ALT  19  /  AlkPhos  84  07-23      CAPILLARY BLOOD GLUCOSE              LOWER EXTREMITY PHYSICAL EXAM:    Vasular: DP/PT 2/4, B/L, CFT <3 seconds B/L, Temperature gradient warm on the right leg, B/L.   Neuro: Epicritic sensation intact to the level of digits, B/L.  Musculoskeletal/Ortho: no gross deformity bl   Skin: no signs of infection to the right foot. Patient had ingrown toenail to the right hallux but resolved now. Right leg lymphangitis from medial thigh to the lower tibia.       RADIOLOGY & ADDITIONAL STUDIES:

## 2019-07-23 NOTE — ED PROVIDER NOTE - CLINICAL SUMMARY MEDICAL DECISION MAKING FREE TEXT BOX
28 y/o f with no PMHx presents to the ED c/o pain and redness on right leg x4 days. Will check ESR, CRP, and white count and likely will put in observation for iv antibiotics and call [facility] podiatry for consult. Will additionally get fetal doppler and check with OB if they would rather have pt admitted.

## 2019-07-23 NOTE — ED PROVIDER NOTE - PROGRESS NOTE DETAILS
Dr. Lock: Dr. Mccormick discussed case with OBGYN service, agree with CDU placement, agree with vancomycin;

## 2019-07-24 DIAGNOSIS — Z29.9 ENCOUNTER FOR PROPHYLACTIC MEASURES, UNSPECIFIED: ICD-10-CM

## 2019-07-24 DIAGNOSIS — I89.1 LYMPHANGITIS: ICD-10-CM

## 2019-07-24 DIAGNOSIS — L03.115 CELLULITIS OF RIGHT LOWER LIMB: ICD-10-CM

## 2019-07-24 LAB
SPECIMEN SOURCE: SIGNIFICANT CHANGE UP
SPECIMEN SOURCE: SIGNIFICANT CHANGE UP

## 2019-07-24 PROCEDURE — 99232 SBSQ HOSP IP/OBS MODERATE 35: CPT

## 2019-07-24 PROCEDURE — 99223 1ST HOSP IP/OBS HIGH 75: CPT | Mod: GC

## 2019-07-24 RX ADMIN — DAPTOMYCIN 114.92 MILLIGRAM(S): 500 INJECTION, POWDER, LYOPHILIZED, FOR SOLUTION INTRAVENOUS at 19:06

## 2019-07-24 NOTE — H&P ADULT - NSHPREVIEWOFSYSTEMS_GEN_ALL_CORE
REVIEW OF SYSTEMS:  CONSTITUTIONAL: No fever, chills, or fatigue  EYES: No eye pain or visual disturbances  ENT:  No difficulty hearing  NECK: No pain or stiffness  RESPIRATORY: No cough, wheezing, or hemoptysis  CARDIOVASCULAR: No chest pain, palpitations or dizziness  GASTROINTESTINAL: No abdominal or epigastric pain  GENITOURINARY: No dysuria or frequency  NEUROLOGICAL: No headaches, memory loss, or loss of strength  SKIN: SEE HPI  LYMPH NODES: No enlarged glands  ENDOCRINE: No hot or cold intolerance  MUSCULOSKELETAL: SEE HPI  PSYCHIATRIC: No depression, anxiety or difficulty sleeping  HEME/LYMPH: No easy bruising or bleeding gums  ALLERGY AND IMMUNOLOGIC: No hives or eczema

## 2019-07-24 NOTE — CONSULT NOTE ADULT - CONSULT REASON
Penicillin allergy and patient requires treatment with cephalosporin.
antibiotic advice
right leg cellulitis/ lymphangitis while being pregnant

## 2019-07-24 NOTE — CONSULT NOTE ADULT - SUBJECTIVE AND OBJECTIVE BOX
Patient is a 29y old  Female who presents with a chief complaint of cellulitis with lymphangitic spread (24 Jul 2019 16:16)    HPI:  29 year old female who is 20 weeks pregnant with no PMH presents with complaint of redness of right leg. Last Thursday (7/18), the patient cut an ingrown toe nail. The next day, she experienced slight redness up to her shin. It was a painful burning shooting pain. 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 The doppler was negative for a DVT. She had an elevated WBC count, so she was prescribed clindamycin. On Saturday she saw a red line traveling from her up to her medial thigh. The next day, the symptoms had improved and the redness had resolved. But, on Monday, the redness and pain returned traveling up to her medial thigh. The pain was so severe that she could hardly walk. Tylenol did not  She asked the podiatrist at her work place, and she was instructed to return to the ED. This has never happened to her before. She denies fever, chest pain, shortness of breath, abdominal pain, vaginal bleeding or vaginal discharge.    In the ED, vitals were 98.3F, -115, -150/75-80 mm Hg, RR 16-17, 100% RA.  She received 1L NS bolus x1 and acetaminophen 975 po x1. (24 Jul 2019 16:51)      Allergies    pcn (Anaphylaxis)  penicillin (Unknown)  Robitussin (Anaphylaxis)    Intolerances      MEDICATIONS  (STANDING):    MEDICATIONS  (PRN):      PAST MEDICAL & SURGICAL HISTORY:  No pertinent past medical history  No pertinent past medical history  No significant past surgical history      REVIEW OF SYSTEMS  All review of systems negative, except for those marked:  General:		  Eyes:			  ENT:			  Pulmonary:		  Cardiac:		  Gastrointestinal:	  Renal/Urologic:		  Musculoskeletal:	  Skin:		  Neurologic:		  Psychiatric:		  Endocrine:		  Hematologic:		  Allergy/Immune:	    SOCIAL/ENVIRONMENTAL HISTORY:  Family Lives:  Education Level:  Tobacco	[] No	[] Yes:  Alcohol		[] No	[] Yes:    FAMILY HISTORY:  No pertinent family history in first degree relatives    Allergies		[] No	[] Yes:   Miscarriages		[] No	[] Yes:   Autoimmune		[] No	[] Yes:   Asthma			[] No	[] Yes:  Still Births		[] No	[] Yes:  Sinusitis		[] No	[] Yes:   Fetal Demise		[] No	[] Yes:   Immune Deficiency	[] No	[] Yes:   Other:			[] No	[] Yes:    Vital Signs Last 24 Hrs  T(C): 36.6 (24 Jul 2019 22:14), Max: 37.1 (24 Jul 2019 17:51)  T(F): 97.8 (24 Jul 2019 22:14), Max: 98.7 (24 Jul 2019 17:51)  HR: 97 (24 Jul 2019 22:14) (97 - 109)  BP: 106/61 (24 Jul 2019 22:14) (105/68 - 121/79)  BP(mean): --  RR: 18 (24 Jul 2019 22:14) (15 - 18)  SpO2: 97% (24 Jul 2019 22:14) (97% - 100%)    PHYSICAL EXAM  All physical exam findings normal, except for those marked:  General:	alert, well developed/well nourished, no acute distress  Eyes      no conjunctival injection, no discharge, no photophobia, intact                 extraocular movements, sclera not icteric, no suborbital bogginess  ENT:    normal tympanic membranes; external ear normal, normal nasal mucosa  .		and turbinates without discharge, no pharyngeal erythema or exudates, no  .		cobblestoning, normal tongue and lips, normal tonsils   Neck    supple, full range of motion  Lymph Nodes	normal size and consistency, non-tender  Cardiovascular	regular rate and rhythm; Normal S1, S2; No murmur  Respiratory	good air movement bilaterally, no wheezing or crackles,  no retractions  Abdominal	soft; ND, NT, no hepatosplenomegaly, + bowel sounds  		normal external genitalia, no rash  Skin		skin intact and not indurated; no rash, no desquamation  Neurologic	alert, appropriate for age, cranial nerves II-XII grossly normal  Musculoskeletal	 no joint swelling, erythema, or tenderness; full range of motion  .			with no contractures; no muscle tenderness; no clubbing; no cyanosis;  .			no edema    Lab Results:                        13.1   14.15 )-----------( 263      ( 23 Jul 2019 09:15 )             40.4     07-23    137  |  107  |  9   ----------------------------<  82  3.8   |  20<L>  |  0.49<L>    Ca    9.0      23 Jul 2019 09:15    TPro  6.9  /  Alb  3.6  /  TBili  < 0.2<L>  /  DBili  x   /  AST  14  /  ALT  19  /  AlkPhos  84  07-23    LIVER FUNCTIONS - ( 23 Jul 2019 09:15 )  Alb: 3.6 g/dL / Pro: 6.9 g/dL / ALK PHOS: 84 u/L / ALT: 19 u/L / AST: 14 u/L / GGT: x                 IMAGING STUDIES: Patient is a 29y old  Female who presents with a chief complaint of cellulitis with lymphangitic spread (24 Jul 2019 16:16)    HPI:  29 year old female, 20 weeks pregnant with allergic rhinitis, and food allergy (shrimp and mushroom) presents with complaint of redness of right leg. Last Thursday (7/18), the patient cut an ingrown toe nail. The next day, she experienced slight redness up to her shin. It was a painful burning shooting pain. 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 The doppler was negative for a DVT. She had an elevated WBC count, so she was prescribed clindamycin. On Saturday she saw a red line traveling from her up to her medial thigh. The next day, the symptoms had improved and the redness had resolved. But, on Monday, the redness and pain returned traveling up to her medial thigh. The pain was so severe that she could hardly walk. Tylenol did not  She asked the podiatrist at her work place, and she was instructed to return to the ED. This has never happened to her before. She denies fever, chest pain, shortness of breath, abdominal pain, vaginal bleeding or vaginal discharge.    When the patient was a baby, she was given penicillin for a childhood infection and was told to have developed facial edema as well as "throat closing."  Patient has avoided penicillins since then.     In the ED, vitals were 98.3F, -115, -150/75-80 mm Hg, RR 16-17, 100% RA.  She received 1L NS bolus x1 and acetaminophen 975 po x1. (24 Jul 2019 16:51)      Allergies    pcn (Anaphylaxis)  penicillin (Unknown)  Robitussin (Anaphylaxis)    Intolerances      MEDICATIONS  (STANDING):    MEDICATIONS  (PRN):      PAST MEDICAL & SURGICAL HISTORY:  No pertinent past medical history  No pertinent past medical history  No significant past surgical history      REVIEW OF SYSTEMS  All review of systems negative, except for those marked:  : pregenant	  Eyes:			  ENT: allergic rhinitis			  Pulm: no asthma	  Gastrointestinal:	none  Renal/Urologic:		  Musculoskeletal:	  Skin:	rash, no hives	  Allergy/Immune:	 allergic rhinitis, food allergy    SOCIAL/ENVIRONMENTAL HISTORY:  Family Lives:  Education Level:  Tobacco	[X] No	[] Yes:  Alcohol		[X] No	[] Yes:    FAMILY HISTORY:  No pertinent family history in first degree relatives    Allergies		[] No	[X] Yes: see above    Asthma			[] No	[] Yes:  Still Births		[X] No	[] Yes:  Sinusitis		[X] No	[] Yes:   Fetal Demise		[X] No	[] Yes:   Immune Deficiency	[X] No	[] Yes:   Other:			[X] No	[] Yes:    Vital Signs Last 24 Hrs  T(C): 36.6 (24 Jul 2019 22:14), Max: 37.1 (24 Jul 2019 17:51)  T(F): 97.8 (24 Jul 2019 22:14), Max: 98.7 (24 Jul 2019 17:51)  HR: 97 (24 Jul 2019 22:14) (97 - 109)  BP: 106/61 (24 Jul 2019 22:14) (105/68 - 121/79)  BP(mean): --  RR: 18 (24 Jul 2019 22:14) (15 - 18)  SpO2: 97% (24 Jul 2019 22:14) (97% - 100%)    PHYSICAL EXAM  All physical exam findings normal, except for those marked:  General:	alert, well developed/well nourished, no acute distress  Eyes      no conjunctival injection, no discharge, no photophobia, intact                 extraocular movements, sclera not icteric, no suborbital bogginess  ENT:    normal tympanic membranes; external ear normal, normal nasal mucosa  .		and turbinates without discharge, no pharyngeal erythema or exudates, no  .		cobblestoning, normal tongue and lips, normal tonsils   Neck    supple, full range of motion  Lymph Nodes	normal size and consistency, non-tender  Cardiovascular	regular rate and rhythm; Normal S1, S2; No murmur  Respiratory	good air movement bilaterally, no wheezing or crackles,  no retractions  Abdominal	soft; ND, NT, no hepatosplenomegaly, + bowel sounds  		normal external genitalia, no rash  Skin		skin intact and no desquamation; right leg upper thigh region with erythema, induration; improved since the marked induration and erythema noted yesterday  Neurologic	alert, appropriate for age, cranial nerves II-XII grossly normal  Musculoskeletal	 no joint swelling, erythema, or tenderness; full range of motion  .			with no contractures; no muscle tenderness; no clubbing; no cyanosis;  .			no edema    Lab Results:                        13.1   14.15 )-----------( 263      ( 23 Jul 2019 09:15 )             40.4     07-23    137  |  107  |  9   ----------------------------<  82  3.8   |  20<L>  |  0.49<L>    Ca    9.0      23 Jul 2019 09:15    TPro  6.9  /  Alb  3.6  /  TBili  < 0.2<L>  /  DBili  x   /  AST  14  /  ALT  19  /  AlkPhos  84  07-23    LIVER FUNCTIONS - ( 23 Jul 2019 09:15 )  Alb: 3.6 g/dL / Pro: 6.9 g/dL / ALK PHOS: 84 u/L / ALT: 19 u/L / AST: 14 u/L / GGT: x                 IMAGING STUDIES: Patient is a 29y old  Female who presents with a chief complaint of cellulitis with lymphangitic spread (24 Jul 2019 16:16)    HPI:  29 year old female, 20 weeks pregnant with allergic rhinitis, and food allergy (shrimp and mushroom) presents with complaint of redness of right leg. Last Thursday (7/18), the patient cut an ingrown toe nail. The next day, she experienced slight redness up to her shin. It was a painful burning shooting pain. 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 The doppler was negative for a DVT. She had an elevated WBC count, so she was prescribed clindamycin. On Saturday she saw a red line traveling from her up to her medial thigh. The next day, the symptoms had improved and the redness had resolved. But, on Monday, the redness and pain returned traveling up to her medial thigh. The pain was so severe that she could hardly walk. Tylenol did not  She asked the podiatrist at her work place, and she was instructed to return to the ED. This has never happened to her before. She denies fever, chest pain, shortness of breath, abdominal pain, vaginal bleeding or vaginal discharge.    When the patient was a baby, she was given penicillin for a childhood infection and was told to have developed facial edema as well as "throat closing."  Patient has avoided penicillins since then.     In the ED, vitals were 98.3F, -115, -150/75-80 mm Hg, RR 16-17, 100% RA.  She received 1L NS bolus x1 and acetaminophen 975 po x1. (24 Jul 2019 16:51)      Allergies    pcn (Anaphylaxis)  penicillin (Unknown)  Robitussin (Anaphylaxis)    Intolerances      MEDICATIONS  (STANDING):    MEDICATIONS  (PRN):      PAST MEDICAL & SURGICAL HISTORY:  No pertinent past medical history  No pertinent past medical history  No significant past surgical history      REVIEW OF SYSTEMS  All review of systems negative, except for those marked:  : pregenant	  Eyes:			  ENT: allergic rhinitis			  Pulm: no asthma	  Gastrointestinal:	none  Renal/Urologic:		  Musculoskeletal:	  Skin:	rash, no hives	  Allergy/Immune:	 allergic rhinitis, food allergy    SOCIAL/ENVIRONMENTAL HISTORY:  Family Lives:  Education Level:  Tobacco	[X] No	[] Yes:  Alcohol		[X] No	[] Yes:    FAMILY HISTORY:  No pertinent family history in first degree relatives    Allergies		[] No	[X] Yes: see above    Asthma			[] No	[] Yes:  Still Births		[X] No	[] Yes:  Sinusitis		[X] No	[] Yes:   Fetal Demise		[X] No	[] Yes:   Immune Deficiency	[X] No	[] Yes:   Other:			[X] No	[] Yes:    Vital Signs Last 24 Hrs  T(C): 36.6 (24 Jul 2019 22:14), Max: 37.1 (24 Jul 2019 17:51)  T(F): 97.8 (24 Jul 2019 22:14), Max: 98.7 (24 Jul 2019 17:51)  HR: 97 (24 Jul 2019 22:14) (97 - 109)  BP: 106/61 (24 Jul 2019 22:14) (105/68 - 121/79)  BP(mean): --  RR: 18 (24 Jul 2019 22:14) (15 - 18)  SpO2: 97% (24 Jul 2019 22:14) (97% - 100%)    PHYSICAL EXAM  All physical exam findings normal, except for those marked:  General:	alert, well developed/well nourished, no acute distress  Eyes      no conjunctival injection, no discharge, no photophobia, intact                 extraocular movements, sclera not icteric, no suborbital bogginess  ENT:    normal tympanic membranes; external ear normal, normal nasal mucosa  .		and turbinates without discharge, no pharyngeal erythema or exudates, no  .		cobblestoning, normal tongue and lips, normal tonsils   Neck    supple, full range of motion  Lymph Nodes	normal size and consistency, non-tender  Cardiovascular	regular rate and rhythm; Normal S1, S2; No murmur  Respiratory	good air movement bilaterally, no wheezing or crackles,  no retractions  Abdominal	soft, pregnant abdomen; ND, NT, no hepatosplenomegaly, + bowel sounds  Skin		skin intact and no desquamation; right leg upper thigh region with erythema, induration; improved since the marked induration and erythema noted yesterday  Neurologic	alert, appropriate for age, cranial nerves II-XII grossly normal  Musculoskeletal	 no joint swelling, erythema, or tenderness; full range of motion  .			with no contractures; no muscle tenderness; no clubbing; no cyanosis;  .			no edema    Lab Results:                        13.1   14.15 )-----------( 263      ( 23 Jul 2019 09:15 )             40.4     07-23    137  |  107  |  9   ----------------------------<  82  3.8   |  20<L>  |  0.49<L>    Ca    9.0      23 Jul 2019 09:15    TPro  6.9  /  Alb  3.6  /  TBili  < 0.2<L>  /  DBili  x   /  AST  14  /  ALT  19  /  AlkPhos  84  07-23    LIVER FUNCTIONS - ( 23 Jul 2019 09:15 )  Alb: 3.6 g/dL / Pro: 6.9 g/dL / ALK PHOS: 84 u/L / ALT: 19 u/L / AST: 14 u/L / GGT: x                 IMAGING STUDIES: Patient is a 29 year old female who presents with a chief complaint of cellulitis with lymphangitic spread (24 Jul 2019 16:16)    HPI:  29 year old female, 20 weeks pregnant with allergic rhinitis, and food allergy (shrimp and mushroom) presents with complaint of redness of right leg. Last Thursday (7/18), the patient cut an ingrown toe nail. The next day, she experienced slight redness up to her shin. It was a painful burning shooting pain. 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 The doppler was negative for a DVT. She had an elevated WBC count, so she was prescribed clindamycin. On Saturday she saw a red line traveling from her up to her medial thigh. The next day, the symptoms had improved and the redness had resolved. But, on Monday, the redness and pain returned traveling up to her medial thigh. The pain was so severe that she could hardly walk. Tylenol did not  She asked the podiatrist at her work place, and she was instructed to return to the ED. This has never happened to her before. She denies fever, chest pain, shortness of breath, abdominal pain, vaginal bleeding or vaginal discharge.    When the patient was a baby, she was given penicillin for a childhood infection and was told to have developed facial edema as well as "throat closing."  Patient has avoided penicillins since then.     In the ED, vitals were 98.3F, -115, -150/75-80 mm Hg, RR 16-17, 100% RA.  She received 1L NS bolus x1 and acetaminophen 975 po x1. (24 Jul 2019 16:51)      Allergies    pcn (Anaphylaxis)  penicillin (Unknown)  Robitussin (Anaphylaxis)    Intolerances      MEDICATIONS  (STANDING):    MEDICATIONS  (PRN):      PAST MEDICAL & SURGICAL HISTORY:  No pertinent past medical history  No pertinent past medical history  No significant past surgical history      REVIEW OF SYSTEMS  All review of systems negative, except for those marked:  : pregnant	  Eyes:	none		  ENT: allergic rhinitis			  Pulm: no asthma	  Gastrointestinal:	none  Renal/Urologic:		  Musculoskeletal:	  Skin:	rash, no hives	  Allergy/Immune:	 allergic rhinitis, food allergy    SOCIAL/ENVIRONMENTAL HISTORY:  Family Lives:  Education Level:  Tobacco	[X] No	[] Yes:  Alcohol		[X] No	[] Yes:    FAMILY HISTORY:  No pertinent family history in first degree relatives    Allergies		[] No	[X] Yes: see above    Asthma			[X No	[] Yes:  Still Births		[X] No	[] Yes:  Sinusitis		[X] No	[] Yes:   Fetal Demise		[X] No	[] Yes:   Immune Deficiency	[X] No	[] Yes:   Other:			[X] No	[] Yes:    Vital Signs Last 24 Hrs  T(C): 36.6 (24 Jul 2019 22:14), Max: 37.1 (24 Jul 2019 17:51)  T(F): 97.8 (24 Jul 2019 22:14), Max: 98.7 (24 Jul 2019 17:51)  HR: 97 (24 Jul 2019 22:14) (97 - 109)  BP: 106/61 (24 Jul 2019 22:14) (105/68 - 121/79)  BP(mean): --  RR: 18 (24 Jul 2019 22:14) (15 - 18)  SpO2: 97% (24 Jul 2019 22:14) (97% - 100%)    PHYSICAL EXAM  All physical exam findings normal, except for those marked:  General:	alert, well developed/well nourished, no acute distress  Eyes      no conjunctival injection, no discharge, no photophobia, intact                 extraocular movements, sclera not icteric, no suborbital bogginess  ENT:    normal tympanic membranes; external ear normal, normal nasal mucosa  .		and turbinates without discharge, no pharyngeal erythema or exudates, no  .		cobblestoning, normal tongue and lips, normal tonsils   Neck    supple, full range of motion  Lymph Nodes	normal size and consistency, non-tender  Cardiovascular	regular rate and rhythm; Normal S1, S2; No murmur  Respiratory	good air movement bilaterally, no wheezing or crackles,  no retractions  Abdominal	soft, pregnant abdomen; ND, NT, no hepatosplenomegaly, + bowel sounds  Skin		skin intact and no desquamation; right leg upper thigh region with erythema, induration; improved since the marked induration and erythema noted yesterday  Neurologic	alert, appropriate for age, cranial nerves II-XII grossly normal  Musculoskeletal	 no joint swelling, erythema, or tenderness; full range of motion  .			with no contractures; no muscle tenderness; no clubbing; no cyanosis;  .			no edema    Lab Results:                        13.1   14.15 )-----------( 263      ( 23 Jul 2019 09:15 )             40.4     07-23    137  |  107  |  9   ----------------------------<  82  3.8   |  20<L>  |  0.49<L>    Ca    9.0      23 Jul 2019 09:15    TPro  6.9  /  Alb  3.6  /  TBili  < 0.2<L>  /  DBili  x   /  AST  14  /  ALT  19  /  AlkPhos  84  07-23    LIVER FUNCTIONS - ( 23 Jul 2019 09:15 )  Alb: 3.6 g/dL / Pro: 6.9 g/dL / ALK PHOS: 84 u/L / ALT: 19 u/L / AST: 14 u/L / GGT: x                 IMAGING STUDIES:

## 2019-07-24 NOTE — H&P ADULT - ASSESSMENT
29 year old female who is 20 weeks pregnant with no PMH who recently presented to the ED with right leg redness presents  again with complaint of persistent redness and pain of right leg, admitted for treatment of cellulitis.

## 2019-07-24 NOTE — CONSULT NOTE ADULT - PROBLEM SELECTOR RECOMMENDATION 9
- Cephalexin graded challenge as follows:  1. Give 10% of desired dose, monitor 30 minutes  2. If tolerates, give 30% of desired dose, monitor for 30 minutes  3. if tolerates, give remaining 60% of dose and monitor  4. She can safely be discharged 1 hour after full dose finishes, benadryl can be taken if needed  -follow up in allergy clinic for penicillin testing after pregnancy  [phone] - Cephalexin graded challenge as follows:  1. Give 10% of desired dose, monitor 30 minutes  2. If tolerates, give 30% of desired dose, monitor for 30 minutes.  3. if tolerates, give remaining 60% of dose and monitor  4. Monitoring for a minimum of 90 minutes after full dose finishes,     Any necessary reactions that are mild can be treated with benadryl; consider the risks, benefits and alternatives of treatment with Epinephrine, if needed, should be considered  -follow up in our office or with patient's private allergist for penicillin testing after pregnancy.  We have discussed this with the patient who is in agreement.  [phone]

## 2019-07-24 NOTE — H&P ADULT - HISTORY OF PRESENT ILLNESS
29 year old female who is 20 weeks pregnant with no PMH presents with complaint of redness of right leg. Last Thursday (7/18), the patient cut an ingrown toe nail. The next day, she experienced slight redness up to her shin. It was a painful burning shooting pain. 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 The doppler was negative for a DVT. She had an elevated WBC count, so she was prescribed clindamycin. On Saturday she saw a red line traveling from her up to her medial thigh. The next day, the symptoms had improved and the redness had resolved. But, on Monday, the redness and pain returned traveling up to her medial thigh. The pain was so severe that she could hardly walk. Tylenol did not  She asked the podiatrist at her work place, and she was instructed to return to the ED. This has never happened to her before. She denies fever, chest pain, shortness of breath, abdominal pain, vaginal bleeding or vaginal discharge. 29 year old female who is 20 weeks pregnant with no PMH presents with complaint of redness of right leg. Last Thursday (7/18), the patient cut an ingrown toe nail. The next day, she experienced slight redness up to her shin. It was a painful burning shooting pain. 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 The doppler was negative for a DVT. She had an elevated WBC count, so she was prescribed clindamycin. On Saturday she saw a red line traveling from her up to her medial thigh. The next day, the symptoms had improved and the redness had resolved. But, on Monday, the redness and pain returned traveling up to her medial thigh. The pain was so severe that she could hardly walk. Tylenol did not  She asked the podiatrist at her work place, and she was instructed to return to the ED. This has never happened to her before. She denies fever, chest pain, shortness of breath, abdominal pain, vaginal bleeding or vaginal discharge.    In the ED, vitals were 98.3F, -115, -150/75-80 mm Hg, RR 16-17, 100% RA.  She received 1L NS bolus x1 and acetaminophen 975 po x1.

## 2019-07-24 NOTE — CONSULT NOTE ADULT - PROBLEM SELECTOR RECOMMENDATION 2
-continue to avoid mushroom and shrimp  -has a current EpiPen to use in case of anaphylaxis  -follow up with allergy clinic -continue to avoid mushroom and shrimp  -has a current EpiPen to use in case of anaphylaxis  -follow up with allergy clinic or with patient's private allergist

## 2019-07-24 NOTE — ED CDU PROVIDER DISPOSITION NOTE - CLINICAL COURSE
as per subsequent day note  "29 yr old female who is pregnant with red leg cellulitis.  Overnight slept fine, afebrile, streaking has extended past lines that were marked yesterday  Pe: well appearing; VSS; CTAB/L; s1 s2 no m/r/g ext: right leg cellulitis with streaking to medial thigh; warm and tender"  received abx and will be admitted given somewhat worse

## 2019-07-24 NOTE — H&P ADULT - PROBLEM SELECTOR PLAN 1
Patient presents again with persistent erythema and swelling of right leg. WBC elevated at 14.15. Was treated for cellulitis with clindamycin. Right lower extremity doppler negative for DVT. BCXs with no growth.  - lymphangitis likely 2/2 spread from right foot cellulitis from ingrown toe nail  - was seen by Podiatry who noted no signs of infection to the right foot, but patient with right leg lymphangitis   - was seen by ID, who recommends Daptomycin for now given penicillin allergy  - Allergy and Immunology consulted for Keflex testing and desensitization; will see patient in AM  - trend CBC  - monitor vital signs Patient presents again with persistent erythema and swelling of right leg. WBC elevated at 14.15. Was treated for cellulitis with clindamycin, but patient saw no relief. Right lower extremity doppler negative for DVT. BCXs with no growth.  - patient now with lymphangitis likely 2/2 spread from right foot cellulitis from ingrown toe nail  - was seen by Podiatry who noted no signs of infection to the right foot, but patient with right leg lymphangitis   - was seen by ID, who recommends Daptomycin for now given penicillin allergy  - Allergy and Immunology consulted for Keflex testing and desensitization; will see patient in AM  - trend CBC  - monitor vital signs

## 2019-07-24 NOTE — ED CDU PROVIDER SUBSEQUENT DAY NOTE - ATTENDING CONTRIBUTION TO CARE
agree with PA note  29 yr old female who is pregnant with red leg cellulitis.  Overnight slept fine, afebrile, streaking has extended past lines that were marked yesterday    Pe: well appearing; VSS; CTAB/L; s1 s2 no m/r/g ext: right leg cellulitis with streaking to medial thigh; warm and tender

## 2019-07-24 NOTE — PROGRESS NOTE ADULT - SUBJECTIVE AND OBJECTIVE BOX
Follow Up:  right leg cellulitis, ingrown toe nail, 2nd trimester pregnancy    Inverval History/ROS:   less pain lower leg and able to put pressure on foot to ambulate. increased pain upper thigh.  no fever, chills.    Allergies  pcn (Anaphylaxis)  penicillin (Unknown)  Robitussin (Unknown)        ANTIMICROBIALS:  DAPTOmycin IVPB 373 every 24 hours      OTHER MEDS:      Vital Signs Last 24 Hrs  T(C): 36.6 (24 Jul 2019 10:37), Max: 36.7 (23 Jul 2019 21:27)  T(F): 97.8 (24 Jul 2019 10:37), Max: 98.1 (24 Jul 2019 06:09)  HR: 109 (24 Jul 2019 10:37) (93 - 109)  BP: 109/67 (24 Jul 2019 10:37) (105/68 - 112/74)  BP(mean): --  RR: 17 (24 Jul 2019 10:37) (15 - 18)  SpO2: 98% (24 Jul 2019 10:37) (98% - 100%)    PHYSICAL EXAM:  General:  non-toxic  HEAD/EYES:  white sclera   ENT:  supple   Cardiovascular:  S1S2  Respiratory:   clear to ausculation bilaterally  GI:   soft, non-tender, normal bowel sounds  :   no CVA tenderness   Musculoskeletal:  no synovitis  Neurologic:   non-focal exam   Skin:  swelling over dorsum right foot, decreased erythema medial lower leg, more intense erythema and warmth medial thigh  Psychiatric:  [x ] appropriate affect [x ] alert & oriented  Lines:  no plebitis peripheral iv                                13.1   14.15 )-----------( 263      ( 23 Jul 2019 09:15 )             40.4       07-23    137  |  107  |  9   ----------------------------<  82  3.8   |  20<L>  |  0.49<L>    Ca    9.0      23 Jul 2019 09:15    TPro  6.9  /  Alb  3.6  /  TBili  < 0.2<L>  /  DBili  x   /  AST  14  /  ALT  19  /  AlkPhos  84  07-23          MICROBIOLOGY:  v  BLOOD PERIPHERAL  07-23-19 --  --  --  Culture - Blood (07.23.19 @ 10:43)    Culture - Blood:   NO ORGANISMS ISOLATED  NO ORGANISMS ISOLATED AT 24 HOURS    Specimen Source: BLOOD VENOUS    Culture - Blood (07.23.19 @ 10:43)    Culture - Blood:   NO ORGANISMS ISOLATED  NO ORGANISMS ISOLATED AT 24 HOURS    Specimen Source: BLOOD PERIPHERAL          RADIOLOGY:

## 2019-07-24 NOTE — H&P ADULT - NSHPPHYSICALEXAM_GEN_ALL_CORE
Vital Signs Last 24 Hrs  T(C): 36.6 (24 Jul 2019 10:37), Max: 36.7 (23 Jul 2019 21:27)  T(F): 97.8 (24 Jul 2019 10:37), Max: 98.1 (24 Jul 2019 06:09)  HR: 109 (24 Jul 2019 10:37) (93 - 109)  BP: 109/67 (24 Jul 2019 10:37) (105/68 - 112/74)  BP(mean): --  RR: 17 (24 Jul 2019 10:37) (15 - 18)  SpO2: 98% (24 Jul 2019 10:37) (98% - 100%)    General: Well-nourished, well-developed, NAD  Head: Normocephalic, Atraumatic  Eyes: PERRLA, EOMI, normal sclera  Throat: Moist mucous membranes  Respiratory: CTAB, normal respiratory effort, no wheezes, crackles, rales  CV: Tachycardic, regular rhythm, S1/S2, no murmurs, rubs or gallops  Abdominal: Soft, bowel sounds present, NT, ND +BS  Extremities: right leg and right  to palpation, 2+ DP pulses  Neurological: A&Ox4, MAEx4, non-focal  Skin: erythema extending from medial lower leg up to medial thigh Vital Signs Last 24 Hrs  T(C): 36.6 (24 Jul 2019 10:37), Max: 36.7 (23 Jul 2019 21:27)  T(F): 97.8 (24 Jul 2019 10:37), Max: 98.1 (24 Jul 2019 06:09)  HR: 109 (24 Jul 2019 10:37) (93 - 109)  BP: 109/67 (24 Jul 2019 10:37) (105/68 - 112/74)  BP(mean): --  RR: 17 (24 Jul 2019 10:37) (15 - 18)  SpO2: 98% (24 Jul 2019 10:37) (98% - 100%)    General: Well-nourished, well-developed, NAD  Head: Normocephalic, Atraumatic  Eyes: PERRLA, EOMI, normal sclera  Throat: Moist mucous membranes  Respiratory: CTAB, normal respiratory effort, no wheezes, crackles, rales  CV: Tachycardic, regular rhythm, S1/S2, no murmurs, rubs or gallops  Abdominal: Soft, bowel sounds present, NT, ND +BS  Extremities: right leg and right  to palpation, 2+ DP pulses  Neurological: A&Ox4, MAEx4, non-focal  Skin: blanching erythema extending from medial lower leg up to medial thigh

## 2019-07-24 NOTE — ED CDU PROVIDER SUBSEQUENT DAY NOTE - PROGRESS NOTE DETAILS
ryan abreu: pt restig comfortably in bed all nigth, states that she thinks that the rash appears to be getting slightly  better, jasmyn continue stephanie xba and reasses Received signout from ERIC Bernal. Pt p/w RLE cellulitis with lymphangitic spread, seen by ID and started on daptomycin given she is 20 weeks pregnant and a penicillin allergy. This AM pt continues to have spread of erythema to the upper thigh outside of demarcated line but in the lower leg has some improvement. She continues to have pain in the leg as well. Will reassess this afternoon, may require admission Pt with spread of erythema to lower leg, will admit for further treatment. Pt agrees with this plan Spoke with hospitalist  who accepts pt to her service, text paged MAR.

## 2019-07-24 NOTE — CONSULT NOTE ADULT - ASSESSMENT
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 Allergy was consulted for recommendations for treatment with a cephalosporin in the setting of a history of penicillin allergy. While there is a known risk of cross reaction to cephalosporins in those that are penicillin allergic, this risk is small at less than 2%. In those that do have a reaction, the risk of having a severe reaction requiring epinephrine is also very small. While this does not mean there will not be a reaction in this patient, the risk benefit analysis and the need for a different antibiotic to treat her infection may favor a cautious graded challenge to the cephalexin. It is generally recommended to avoid allergy testing in pregnant patients because of the risk of anaphylaxis requiring epinephrine that is harmful to the fetus. Recommend to give a trial with 10% of the desired dose followed by 30 minutes of close monitoring for reaction. If the patient tolerates can give 30% of dose with 30 minutes monitoring and then the remaining 60% of dose. She should be monitored for approximately 1 hour prior to discharge. She can receive benadryl safely in pregnancy is needed. Please contact with any questions. [phone]. 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 Allergy was consulted for recommendations for treatment with a cephalosporin in the setting of a history of penicillin allergy.   Although skin testing for Penicillins is available, it is suggested that skin testing not be performed in a pregnant patient.  Thus, risk stratification based on testing is limited.   We have discussed this with the patient.  Thus, empiric decision making is warranted.     While there is a known risk of cross reaction to cephalosporins in those that are penicillin allergic, this risk is small at less than 2% in the literature traditionally. In those that do have a reaction, the risk of having a severe reaction requiring epinephrine is also very small. While this does not mean there will not be a reaction, risk benefit analysis and the need for a different antibiotic to treat her infection should be considered prior to commencement of process.      If after discussion with the patient, treatment  with Keflex is determined appropriate, we suggest a cautious graded challenge to the cephalexin. It is generally recommended to avoid allergy testing in pregnant patients because of the risk of anaphylaxis requiring epinephrine would be harmful to the fetus. Thus, we recommend to give a trial with 10% of the desired dose followed by 30 minutes of close monitoring for reaction. If the patient tolerates can give 30% of dose with 30 minutes monitoring and then the remaining 60% of dose, again with monitoring for allergic reaction in the hospital. She should be monitored for approximately 1.5 hours minimum prior to discharge. She can receive benadryl for any mild reactions if needed. Please contact with any questions. [phone].

## 2019-07-24 NOTE — H&P ADULT - NSHPLABSRESULTS_GEN_ALL_CORE
LABS:                          13.1   14.15 )-----------( 263      ( 23 Jul 2019 09:15 )             40.4     Hb Trend: 13.1<--, 12.7<--  WBC Trend: 14.15<--, 13.31<--  Plt Trend: 263<--, 276<--          07-23    137  |  107  |  9   ----------------------------<  82  3.8   |  20<L>  |  0.49<L>    Ca    9.0      23 Jul 2019 09:15    TPro  6.9  /  Alb  3.6  /  TBili  < 0.2<L>  /  DBili  x   /  AST  14  /  ALT  19  /  AlkPhos  84  07-23          CAPILLARY BLOOD GLUCOSE            NO ORGANISMS ISOLATED  NO ORGANISMS ISOLATED AT 24 HOURS    --    BLOOD PERIPHERAL      IMAGING/STUDIES:    < from: US Duplex Venous Lower Ext Ltd, Right (07.23.19 @ 12:18) >      EXAM:  US DPLX LWR EXT VEINS LTD RT        PROCEDURE DATE:  Jul 23 2019         INTERPRETATION:  CLINICAL INFORMATION: Right leg pain and redness for 4   days.    COMPARISON: July 19, 2019.    TECHNIQUE: Duplex sonography of the RIGHT LOWER extremity veins with   color and spectral Doppler, with and without compression.      FINDINGS:    There is normal compressibility of the right common femoral, femoral and   popliteal veins.     The contralateral common femoral vein is patent.    Doppler examination shows normal spontaneous and phasic flow.    No calf vein thrombosis is detected.    IMPRESSION:     No evidence of right lower extremity deep venous thrombosis.    < end of copied text >

## 2019-07-25 ENCOUNTER — TRANSCRIPTION ENCOUNTER (OUTPATIENT)
Age: 29
End: 2019-07-25

## 2019-07-25 DIAGNOSIS — Z88.0 ALLERGY STATUS TO PENICILLIN: ICD-10-CM

## 2019-07-25 DIAGNOSIS — Z91.018 ALLERGY TO OTHER FOODS: ICD-10-CM

## 2019-07-25 LAB
ALBUMIN SERPL ELPH-MCNC: 3.6 G/DL — SIGNIFICANT CHANGE UP (ref 3.3–5)
ALP SERPL-CCNC: 83 U/L — SIGNIFICANT CHANGE UP (ref 40–120)
ALT FLD-CCNC: 18 U/L — SIGNIFICANT CHANGE UP (ref 4–33)
ANION GAP SERPL CALC-SCNC: 14 MMO/L — SIGNIFICANT CHANGE UP (ref 7–14)
AST SERPL-CCNC: 11 U/L — SIGNIFICANT CHANGE UP (ref 4–32)
BASOPHILS # BLD AUTO: 0.03 K/UL — SIGNIFICANT CHANGE UP (ref 0–0.2)
BASOPHILS NFR BLD AUTO: 0.2 % — SIGNIFICANT CHANGE UP (ref 0–2)
BILIRUB SERPL-MCNC: < 0.2 MG/DL — LOW (ref 0.2–1.2)
BUN SERPL-MCNC: 10 MG/DL — SIGNIFICANT CHANGE UP (ref 7–23)
CALCIUM SERPL-MCNC: 8.7 MG/DL — SIGNIFICANT CHANGE UP (ref 8.4–10.5)
CHLORIDE SERPL-SCNC: 102 MMOL/L — SIGNIFICANT CHANGE UP (ref 98–107)
CO2 SERPL-SCNC: 20 MMOL/L — LOW (ref 22–31)
CREAT SERPL-MCNC: 0.48 MG/DL — LOW (ref 0.5–1.3)
EOSINOPHIL # BLD AUTO: 0.21 K/UL — SIGNIFICANT CHANGE UP (ref 0–0.5)
EOSINOPHIL NFR BLD AUTO: 1.7 % — SIGNIFICANT CHANGE UP (ref 0–6)
GLUCOSE SERPL-MCNC: 72 MG/DL — SIGNIFICANT CHANGE UP (ref 70–99)
HCT VFR BLD CALC: 38 % — SIGNIFICANT CHANGE UP (ref 34.5–45)
HGB BLD-MCNC: 12.3 G/DL — SIGNIFICANT CHANGE UP (ref 11.5–15.5)
IMM GRANULOCYTES NFR BLD AUTO: 0.5 % — SIGNIFICANT CHANGE UP (ref 0–1.5)
LYMPHOCYTES # BLD AUTO: 2.51 K/UL — SIGNIFICANT CHANGE UP (ref 1–3.3)
LYMPHOCYTES # BLD AUTO: 20 % — SIGNIFICANT CHANGE UP (ref 13–44)
MAGNESIUM SERPL-MCNC: 2 MG/DL — SIGNIFICANT CHANGE UP (ref 1.6–2.6)
MCHC RBC-ENTMCNC: 29 PG — SIGNIFICANT CHANGE UP (ref 27–34)
MCHC RBC-ENTMCNC: 32.4 % — SIGNIFICANT CHANGE UP (ref 32–36)
MCV RBC AUTO: 89.6 FL — SIGNIFICANT CHANGE UP (ref 80–100)
MONOCYTES # BLD AUTO: 0.54 K/UL — SIGNIFICANT CHANGE UP (ref 0–0.9)
MONOCYTES NFR BLD AUTO: 4.3 % — SIGNIFICANT CHANGE UP (ref 2–14)
NEUTROPHILS # BLD AUTO: 9.19 K/UL — HIGH (ref 1.8–7.4)
NEUTROPHILS NFR BLD AUTO: 73.3 % — SIGNIFICANT CHANGE UP (ref 43–77)
NRBC # FLD: 0 K/UL — SIGNIFICANT CHANGE UP (ref 0–0)
PHOSPHATE SERPL-MCNC: 3.8 MG/DL — SIGNIFICANT CHANGE UP (ref 2.5–4.5)
PLATELET # BLD AUTO: 257 K/UL — SIGNIFICANT CHANGE UP (ref 150–400)
PMV BLD: 8.9 FL — SIGNIFICANT CHANGE UP (ref 7–13)
POTASSIUM SERPL-MCNC: 3.5 MMOL/L — SIGNIFICANT CHANGE UP (ref 3.5–5.3)
POTASSIUM SERPL-SCNC: 3.5 MMOL/L — SIGNIFICANT CHANGE UP (ref 3.5–5.3)
PROT SERPL-MCNC: 6.8 G/DL — SIGNIFICANT CHANGE UP (ref 6–8.3)
RBC # BLD: 4.24 M/UL — SIGNIFICANT CHANGE UP (ref 3.8–5.2)
RBC # FLD: 14.6 % — HIGH (ref 10.3–14.5)
SODIUM SERPL-SCNC: 136 MMOL/L — SIGNIFICANT CHANGE UP (ref 135–145)
WBC # BLD: 12.54 K/UL — HIGH (ref 3.8–10.5)
WBC # FLD AUTO: 12.54 K/UL — HIGH (ref 3.8–10.5)

## 2019-07-25 PROCEDURE — 99222 1ST HOSP IP/OBS MODERATE 55: CPT | Mod: GC

## 2019-07-25 PROCEDURE — 99233 SBSQ HOSP IP/OBS HIGH 50: CPT | Mod: GC

## 2019-07-25 PROCEDURE — 99232 SBSQ HOSP IP/OBS MODERATE 35: CPT

## 2019-07-25 RX ORDER — DAPTOMYCIN 500 MG/10ML
373 INJECTION, POWDER, LYOPHILIZED, FOR SOLUTION INTRAVENOUS ONCE
Refills: 0 | Status: COMPLETED | OUTPATIENT
Start: 2019-07-25 | End: 2019-07-25

## 2019-07-25 RX ADMIN — DAPTOMYCIN 114.92 MILLIGRAM(S): 500 INJECTION, POWDER, LYOPHILIZED, FOR SOLUTION INTRAVENOUS at 19:39

## 2019-07-25 NOTE — DISCHARGE NOTE PROVIDER - NSDCFUADDAPPT_GEN_ALL_CORE_FT
Please follow up with your primary medical doctor in 1 week.     Please follow up with Allergy and Immunology after your pregnancy to do penicillin skin testing. You can call [phone] to set up an appointment. Please follow up with your primary medical doctor in 1 week.     Please follow up with Allergy and Immunology after your pregnancy to do penicillin skin testing. You can call [phone] to set up an appointment.    Please follow up with Dr. Kee tomorrow as planned.

## 2019-07-25 NOTE — PROGRESS NOTE ADULT - PROBLEM SELECTOR PLAN 1
Patient presents again with persistent erythema and swelling of right leg. WBC elevated at 14.15 --> 12.5. Was treated for cellulitis with clindamycin, but patient saw no relief. Right lower extremity doppler negative for DVT. BCXs with no growth.  - patient now with lymphangitis likely 2/2 spread from right foot cellulitis from ingrown toe nail  - was seen by Podiatry who noted no signs of infection to the right foot, but patient with right leg lymphangitis   - was seen by ID, who recommends Daptomycin for now given penicillin allergy. Pt is on Daptomycin 373 mg IV q24 (day 3/7)   - Allergy and Immunology consulted for Keflex testing and desensitization: do not do skin testing on pregnant patients, will consider oral challenge   - trend CBC  - monitor vital signs

## 2019-07-25 NOTE — DISCHARGE NOTE PROVIDER - CARE PROVIDER_API CALL
Ama Kee)  Infectious Disease; Internal Medicine  [address] [place] [address]  Phone: [phone]  Fax: [phone]  Follow Up Time: 1-3 days

## 2019-07-25 NOTE — PROGRESS NOTE ADULT - SUBJECTIVE AND OBJECTIVE BOX
Follow Up:  right leg cellulitis, ingrown toe nail, 2nd trimester pregnancy, h/o allergy to penicillin    Inverval History/ROS:  upset- bug found in bed.   leg feels much better. evaluation by allergy in progress.   no fever, chills.    Allergies  pcn (Anaphylaxis)  penicillin (Unknown)  Robitussin (Unknown)        ANTIMICROBIALS:  DAPTOmycin IVPB 373 every 24 hours      OTHER MEDS:      Vital Signs Last 24 Hrs  T(F): 98.2 (07-25-19 @ 15:36), Max: 98.7 (07-24-19 @ 17:51)  HR: 97 (07-25-19 @ 15:36)  BP: 117/75 (07-25-19 @ 15:36)  RR: 18 (07-25-19 @ 15:36)  SpO2: 99% (07-25-19 @ 15:36) (97% - 99%)    PHYSICAL EXAM:  General:  non-toxic, sitting in chair  HEAD/EYES:  white sclera   ENT:  supple   Cardiovascular:  S1S2  Respiratory:   clear to ausculation bilaterally  GI:   soft, non-tender, normal bowel sounds  :   no CVA tenderness   Musculoskeletal:  no synovitis  Neurologic:   non-focal exam   Skin: minimal  swelling over dorsum right foot, minimal erythema medial lower leg, decreased erythema and warmth medial thigh  Psychiatric:  [x ] appropriate affect [x ] alert & oriented  Lines:  no phlebitis peripheral iv                                           12.3   12.54 )-----------( 257      ( 25 Jul 2019 07:15 )             38.0 07-25    136  |  102  |  10  ----------------------------<  72  3.5   |  20  |  0.48  Ca    8.7      25 Jul 2019 07:15Phos  3.8     07-25Mg     2.0     07-25  TPro  6.8  /  Alb  3.6  /  TBili  < 0.2  /  DBili  x   /  AST  11  /  ALT  18  /  AlkPhos  83  07-25        MICROBIOLOGY:  Culture - Blood (07.23.19 @ 10:43)    Culture - Blood:   NO ORGANISMS ISOLATED  NO ORGANISMS ISOLATED AT 48 HRS.    Specimen Source: BLOOD VENOUS    Culture - Blood (07.23.19 @ 10:43)    Culture - Blood:   NO ORGANISMS ISOLATED  NO ORGANISMS ISOLATED AT 48 HRS.    Specimen Source: BLOOD PERIPHERAL            RADIOLOGY:

## 2019-07-25 NOTE — DISCHARGE NOTE PROVIDER - NSDCCPCAREPLAN_GEN_ALL_CORE_FT
PRINCIPAL DISCHARGE DIAGNOSIS  Diagnosis: Lymphangitis  Assessment and Plan of Treatment: You presented with an infection of your right leg. You were seen by Podiatry, Infectious Disease, and Allergy and Immunology for the infection. You were treated with Daptomycin for it. Please follow up with your primary medical doctor in 1 week.

## 2019-07-25 NOTE — DISCHARGE NOTE PROVIDER - NSFOLLOWUPCLINICS_GEN_ALL_ED_FT
[facility] Allergy and Immunology  Allergy  [address] [place] [address]  Phone: [phone]  Fax:   Follow Up Time: Routine

## 2019-07-25 NOTE — PROGRESS NOTE ADULT - SUBJECTIVE AND OBJECTIVE BOX
April Carter MD  [facility] Medicine [facility]   Pager: -[phone] / [facility] [phone]    Subjective: The patient reports that the redness on her right leg is improving. Patient denies fever, chills, SOB, or chest pain.     REVIEW OF SYSTEMS:  	CONSTITUTIONAL: No fever, chills, or fatigue  	EYES: No eye pain or visual disturbances  	ENT:  No difficulty hearing  	NECK: No pain or stiffness  	RESPIRATORY: No cough, wheezing, or hemoptysis  	CARDIOVASCULAR: No chest pain, palpitations or dizziness  	GASTROINTESTINAL: No abdominal or epigastric pain  	GENITOURINARY: No dysuria or frequency  	NEUROLOGICAL: No headaches, memory loss, or loss of strength  	SKIN: SEE HPI  	LYMPH NODES: No enlarged glands  	ENDOCRINE: No hot or cold intolerance  	MUSCULOSKELETAL: SEE HPI  	PSYCHIATRIC: No depression, anxiety or difficulty sleeping  	HEME/LYMPH: No easy bruising or bleeding gums  ALLERGY AND IMMUNOLOGIC: No hives or eczema     MEDICATIONS  (STANDING):  DAPTOmycin IVPB 373 milliGRAM(s) IV Intermittent once    MEDICATIONS  (PRN):    Physical Exam:   Vital Signs Last 24 Hrs  T(C): 36.3 (2019 05:49), Max: 37.1 (2019 17:51)  T(F): 97.3 (2019 05:49), Max: 98.7 (2019 17:51)  HR: 96 (2019 05:49) (92 - 97)  BP: 113/73 (2019 05:49) (106/61 - 121/79)  BP(mean): --  RR: 18 (2019 05:49) (16 - 18)  SpO2: 99% (2019 05:49) (97% - 99%)    General: Well-nourished, well-developed, NAD  	Head: Normocephalic, Atraumatic  	Eyes: PERRLA, EOMI, normal sclera  	Throat: Moist mucous membranes  	Respiratory: CTAB, normal respiratory effort, no wheezes, crackles, rales  	CV: Tachycardic, regular rhythm, S1/S2, no murmurs, rubs or gallops  	Abdominal: Soft, bowel sounds present, NT, ND +BS  	Extremities: right leg and right  to palpation, 2+ DP pulses  	Neurological: A&Ox4, MAEx4, non-focal  Skin: blanching erythema extending from medial lower leg up to medial thigh    Labs/Imagin.3   12.54 )-----------( 257      ( 2019 07:15 )             38.0         136  |  102  |  10  ----------------------------<  72  3.5   |  20<L>  |  0.48<L>    Ca    8.7      2019 07:15  Phos  3.8       Mg     2.0         TPro  6.8  /  Alb  3.6  /  TBili  < 0.2<L>  /  DBili  x   /  AST  11  /  ALT  18  /  AlkPhos  83      Culture - Blood (19 @ 10:43)    Culture - Blood:   NO ORGANISMS ISOLATED  NO ORGANISMS ISOLATED AT 48 HRS.    Specimen Source: BLOOD VENOUS    Culture - Urine (19 @ 00:42)    Specimen Source: .Urine    Culture Results:   <10,000 CFU/mL Normal Urogenital Justa        	IMAGING/STUDIES:    	< from: US Duplex Venous Lower Ext Ltd, Right (19 @ 12:18) >      	EXAM:  US DPLX LWR EXT VEINS LTD RT        	PROCEDURE DATE:  2019         	INTERPRETATION:  CLINICAL INFORMATION: Right leg pain and redness for 4   	days.    	COMPARISON: 2019.    	TECHNIQUE: Duplex sonography of the RIGHT LOWER extremity veins with   	color and spectral Doppler, with and without compression.      	FINDINGS:    	There is normal compressibility of the right common femoral, femoral and   	popliteal veins.     	The contralateral common femoral vein is patent.    	Doppler examination shows normal spontaneous and phasic flow.    	No calf vein thrombosis is detected.    	IMPRESSION:     	No evidence of right lower extremity deep venous thrombosis.    	< end of copied text >

## 2019-07-25 NOTE — DISCHARGE NOTE PROVIDER - HOSPITAL COURSE
The patient is a 29 year old female who is 20 weeks pregnant with no PMHx who presented with right leg erythema and was admitted for treatment of cellulitis after failing outpatient treatment with Clindamycin. Patient developed lymphangitis likely secondary to spread from right foot cellulitis for an ingrown toenail. 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 She was seen by Podiatry, ID, and Allergy and Immunology.              Problem/Plan - 1:    ·  Problem: Cellulitis of foot, right.  Plan: Patient presents again with persistent erythema and swelling of right leg. WBC elevated at 14.15. Was treated for cellulitis with clindamycin, but patient saw no relief. Right lower extremity doppler negative for DVT. BCXs with no growth.    - patient now with lymphangitis likely 2/2 spread from right foot cellulitis from ingrown toe nail    - was seen by Podiatry who noted no signs of infection to the right foot, but patient with right leg lymphangitis     - was seen by ID, who recommends Daptomycin for now given penicillin allergy    - Allergy and Immunology consulted for Keflex testing and desensitization; will see patient in AM    - trend CBC    - monitor vital signs. The patient is a 29 year old female who is 20 weeks pregnant with no PMHx who presented with right leg erythema and was admitted for treatment of cellulitis after failing outpatient treatment with Clindamycin. Patient developed lymphangitis likely secondary to spread from right foot cellulitis for an ingrown toenail. 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 She was seen by Podiatry, ID, and Allergy and Immunology. Patient remained afebrile and WBC improved. Allergy and Immunology considered doing Keflex challenge in order to evaluate changing to PO meds. The patient is a 29 year old female who is 20 weeks pregnant with no PMHx who presented with right leg erythema and was admitted for treatment of cellulitis after failing outpatient treatment with Clindamycin. Patient developed lymphangitis likely secondary to spread from right foot cellulitis for an ingrown toenail. 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 She was seen by Podiatry, ID, and Allergy and Immunology. Patient remained afebrile and WBC improved. Allergy and Immunology considered doing Keflex challenge in order to evaluate changing to PO meds; pt refused. Pt finished a 7 day course of Daptomycin. Patient was advised to follow up with her PMD in 1 week after discharge. The patient is a 29 year old female who is 20 weeks pregnant with no PMHx who presented with right leg erythema and was admitted for treatment of cellulitis after failing outpatient treatment with Clindamycin. Patient developed lymphangitis likely secondary to spread from right foot cellulitis for an ingrown toenail. 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 She was seen by Podiatry, ID, and Allergy and Immunology. Patient remained afebrile and WBC improved. Allergy and Immunology considered doing Keflex challenge in order to evaluate changing to PO meds; pt refused. Pt finished a 7 day course of Daptomycin. The erythema and lymphangitis improved. Patient was advised to follow up with her PMD in 1 week after discharge.

## 2019-07-26 LAB
ALBUMIN SERPL ELPH-MCNC: 3.3 G/DL — SIGNIFICANT CHANGE UP (ref 3.3–5)
ALP SERPL-CCNC: 91 U/L — SIGNIFICANT CHANGE UP (ref 40–120)
ALT FLD-CCNC: 25 U/L — SIGNIFICANT CHANGE UP (ref 4–33)
ANION GAP SERPL CALC-SCNC: 10 MMO/L — SIGNIFICANT CHANGE UP (ref 7–14)
AST SERPL-CCNC: 16 U/L — SIGNIFICANT CHANGE UP (ref 4–32)
BASOPHILS # BLD AUTO: 0.03 K/UL — SIGNIFICANT CHANGE UP (ref 0–0.2)
BASOPHILS NFR BLD AUTO: 0.3 % — SIGNIFICANT CHANGE UP (ref 0–2)
BILIRUB SERPL-MCNC: < 0.2 MG/DL — LOW (ref 0.2–1.2)
BUN SERPL-MCNC: 7 MG/DL — SIGNIFICANT CHANGE UP (ref 7–23)
CALCIUM SERPL-MCNC: 8.4 MG/DL — SIGNIFICANT CHANGE UP (ref 8.4–10.5)
CHLORIDE SERPL-SCNC: 107 MMOL/L — SIGNIFICANT CHANGE UP (ref 98–107)
CO2 SERPL-SCNC: 21 MMOL/L — LOW (ref 22–31)
CREAT SERPL-MCNC: 0.45 MG/DL — LOW (ref 0.5–1.3)
EOSINOPHIL # BLD AUTO: 0.24 K/UL — SIGNIFICANT CHANGE UP (ref 0–0.5)
EOSINOPHIL NFR BLD AUTO: 2 % — SIGNIFICANT CHANGE UP (ref 0–6)
GLUCOSE SERPL-MCNC: 73 MG/DL — SIGNIFICANT CHANGE UP (ref 70–99)
HCT VFR BLD CALC: 36.4 % — SIGNIFICANT CHANGE UP (ref 34.5–45)
HGB BLD-MCNC: 11.8 G/DL — SIGNIFICANT CHANGE UP (ref 11.5–15.5)
IMM GRANULOCYTES NFR BLD AUTO: 0.3 % — SIGNIFICANT CHANGE UP (ref 0–1.5)
LYMPHOCYTES # BLD AUTO: 2.69 K/UL — SIGNIFICANT CHANGE UP (ref 1–3.3)
LYMPHOCYTES # BLD AUTO: 23 % — SIGNIFICANT CHANGE UP (ref 13–44)
MAGNESIUM SERPL-MCNC: 2 MG/DL — SIGNIFICANT CHANGE UP (ref 1.6–2.6)
MCHC RBC-ENTMCNC: 29.6 PG — SIGNIFICANT CHANGE UP (ref 27–34)
MCHC RBC-ENTMCNC: 32.4 % — SIGNIFICANT CHANGE UP (ref 32–36)
MCV RBC AUTO: 91.2 FL — SIGNIFICANT CHANGE UP (ref 80–100)
MONOCYTES # BLD AUTO: 0.49 K/UL — SIGNIFICANT CHANGE UP (ref 0–0.9)
MONOCYTES NFR BLD AUTO: 4.2 % — SIGNIFICANT CHANGE UP (ref 2–14)
NEUTROPHILS # BLD AUTO: 8.22 K/UL — HIGH (ref 1.8–7.4)
NEUTROPHILS NFR BLD AUTO: 70.2 % — SIGNIFICANT CHANGE UP (ref 43–77)
NRBC # FLD: 0 K/UL — SIGNIFICANT CHANGE UP (ref 0–0)
PHOSPHATE SERPL-MCNC: 3.3 MG/DL — SIGNIFICANT CHANGE UP (ref 2.5–4.5)
PLATELET # BLD AUTO: 264 K/UL — SIGNIFICANT CHANGE UP (ref 150–400)
PMV BLD: 9.3 FL — SIGNIFICANT CHANGE UP (ref 7–13)
POTASSIUM SERPL-MCNC: 3.9 MMOL/L — SIGNIFICANT CHANGE UP (ref 3.5–5.3)
POTASSIUM SERPL-SCNC: 3.9 MMOL/L — SIGNIFICANT CHANGE UP (ref 3.5–5.3)
PROT SERPL-MCNC: 6.3 G/DL — SIGNIFICANT CHANGE UP (ref 6–8.3)
RBC # BLD: 3.99 M/UL — SIGNIFICANT CHANGE UP (ref 3.8–5.2)
RBC # FLD: 14.4 % — SIGNIFICANT CHANGE UP (ref 10.3–14.5)
SODIUM SERPL-SCNC: 138 MMOL/L — SIGNIFICANT CHANGE UP (ref 135–145)
WBC # BLD: 11.71 K/UL — HIGH (ref 3.8–10.5)
WBC # FLD AUTO: 11.71 K/UL — HIGH (ref 3.8–10.5)

## 2019-07-26 PROCEDURE — 99233 SBSQ HOSP IP/OBS HIGH 50: CPT | Mod: GC

## 2019-07-26 RX ORDER — DAPTOMYCIN 500 MG/10ML
373 INJECTION, POWDER, LYOPHILIZED, FOR SOLUTION INTRAVENOUS ONCE
Refills: 0 | Status: COMPLETED | OUTPATIENT
Start: 2019-07-26 | End: 2019-07-26

## 2019-07-26 RX ADMIN — DAPTOMYCIN 114.92 MILLIGRAM(S): 500 INJECTION, POWDER, LYOPHILIZED, FOR SOLUTION INTRAVENOUS at 18:54

## 2019-07-26 NOTE — PROGRESS NOTE ADULT - PROBLEM SELECTOR PLAN 1
Patient presents again with persistent erythema and swelling of right leg. WBC elevated at 14.15 --> 12.5 --> 11.7. Was treated for cellulitis with clindamycin, but patient saw no relief. Right lower extremity doppler negative for DVT. BCXs with no growth.  - patient now with lymphangitis likely 2/2 spread from right foot cellulitis from ingrown toe nail  - was seen by Podiatry who noted no signs of infection to the right foot, but patient with right leg lymphangitis   - was seen by ID, who recommends Daptomycin for now given penicillin allergy. Pt is on Daptomycin 373 mg IV q24 (day 4/5)   - Allergy and Immunology consulted for Keflex testing and desensitization: do not do skin testing on pregnant patients, suggested Keflex graded challenge. Pt refuses   - trend CBC  - monitor vital signs Patient presents again with persistent erythema and swelling of right leg. WBC elevated at 14.15 --> 12.5 --> 11.7. Was treated for cellulitis with clindamycin, but patient saw no relief. Right lower extremity doppler negative for DVT. BCXs with no growth.  - patient now with lymphangitis likely 2/2 spread from right foot cellulitis from ingrown toe nail  - was seen by Podiatry who noted no signs of infection to the right foot, but patient with right leg lymphangitis   - was seen by ID, who recommends Daptomycin for now given penicillin allergy. Pt is on Daptomycin 373 mg IV q24 (day 4/5). F/u ID recs, possible transition to PO Clindamycin   - Allergy and Immunology consulted for Keflex testing and desensitization: do not do skin testing on pregnant patients, suggested Keflex graded challenge. Pt refuses   - trend CBC  - monitor vital signs Patient presents again with persistent erythema and swelling of right leg. WBC elevated at 14.15 --> 12.5 --> 11.7. Was treated for cellulitis with clindamycin, but patient saw no relief. Right lower extremity doppler negative for DVT. BCXs with no growth.  - patient now with lymphangitis likely 2/2 spread from right foot cellulitis from ingrown toe nail  - was seen by Podiatry who noted no signs of infection to the right foot, but patient with right leg lymphangitis   - was seen by ID, who recommends Daptomycin for now given penicillin allergy. Pt is on Daptomycin 373 mg IV q24 (day 4/7). F/u ID recs, continue w/ Daptomycin   - Allergy and Immunology consulted for Keflex testing and desensitization: do not do skin testing on pregnant patients, suggested Keflex graded challenge. Pt refuses   - trend CBC  - monitor vital signs

## 2019-07-26 NOTE — PROGRESS NOTE ADULT - SUBJECTIVE AND OBJECTIVE BOX
April Carter MD  [facility] Medicine [facility]   Pager: [phone] / [facility] [phone]    Subjective: The patient reports that the redness on her right leg is improving. Patient denies fever, chills, SOB, or chest pain. Reports that she feels well.     REVIEW OF SYSTEMS:  	CONSTITUTIONAL: No fever, chills, or fatigue  	EYES: No eye pain or visual disturbances  	ENT:  No difficulty hearing  	NECK: No pain or stiffness  	RESPIRATORY: No cough, wheezing, or hemoptysis  	CARDIOVASCULAR: No chest pain, palpitations or dizziness  	GASTROINTESTINAL: No abdominal or epigastric pain  	GENITOURINARY: No dysuria or frequency  	NEUROLOGICAL: No headaches, memory loss, or loss of strength  	SKIN: SEE HPI  	LYMPH NODES: No enlarged glands  	ENDOCRINE: No hot or cold intolerance  	MUSCULOSKELETAL: SEE HPI  	PSYCHIATRIC: No depression, anxiety or difficulty sleeping  	HEME/LYMPH: No easy bruising or bleeding gums  ALLERGY AND IMMUNOLOGIC: No hives or eczema     MEDICATIONS  (STANDING):    MEDICATIONS  (PRN):    Physical Exam:   Vital Signs Last 24 Hrs  T(C): 36.6 (2019 06:26), Max: 36.8 (2019 15:36)  T(F): 97.8 (2019 06:26), Max: 98.2 (2019 15:36)  HR: 85 (2019 06:26) (85 - 100)  BP: 103/68 (2019 06:26) (103/68 - 124/74)  BP(mean): --  RR: 16 (2019 06:26) (16 - 18)  SpO2: 100% (2019 06:26) (96% - 100%)    General: Well-nourished, well-developed, NAD  	Head: Normocephalic, Atraumatic  	Eyes: PERRLA, EOMI, normal sclera  	Throat: Moist mucous membranes  	Respiratory: CTAB, normal respiratory effort, no wheezes, crackles, rales  	CV: Tachycardic, regular rhythm, S1/S2, no murmurs, rubs or gallops  	Abdominal: Soft, bowel sounds present, NT, ND +BS  	Extremities: right leg and right foot non-tender to palpation, 2+ DP pulses  	Neurological: A&Ox4, MAEx4, non-focal  Skin: blanching erythema extending from medial lower leg up to medial thigh, improving     Labs/Imagin.8   11.71 )-----------( 264      ( 2019 06:30 )             36.4         138  |  107  |  7   ----------------------------<  73  3.9   |  21<L>  |  0.45<L>    Ca    8.4      2019 06:30  Phos  3.3       Mg     2.0         TPro  6.3  /  Alb  3.3  /  TBili  < 0.2<L>  /  DBili  x   /  AST  16  /  ALT  25  /  AlkPhos  91      Culture - Blood (19 @ 10:43)    Culture - Blood:   NO ORGANISMS ISOLATED  NO ORGANISMS ISOLATED AT 48 HRS.    Specimen Source: BLOOD VENOUS          	IMAGING/STUDIES:    	< from: US Duplex Venous Lower Ext Ltd, Right (19 @ 12:18) >      	EXAM:  US DPLX LWR EXT VEINS LTD RT        	PROCEDURE DATE:  2019         	INTERPRETATION:  CLINICAL INFORMATION: Right leg pain and redness for 4   	days.    	COMPARISON: 2019.    	TECHNIQUE: Duplex sonography of the RIGHT LOWER extremity veins with   	color and spectral Doppler, with and without compression.      	FINDINGS:    	There is normal compressibility of the right common femoral, femoral and   	popliteal veins.     	The contralateral common femoral vein is patent.    	Doppler examination shows normal spontaneous and phasic flow.    	No calf vein thrombosis is detected.    	IMPRESSION:     	No evidence of right lower extremity deep venous thrombosis.    	< end of copied text >

## 2019-07-27 LAB
ANION GAP SERPL CALC-SCNC: 10 MMO/L — SIGNIFICANT CHANGE UP (ref 7–14)
BASOPHILS # BLD AUTO: 0.03 K/UL — SIGNIFICANT CHANGE UP (ref 0–0.2)
BASOPHILS NFR BLD AUTO: 0.2 % — SIGNIFICANT CHANGE UP (ref 0–2)
BUN SERPL-MCNC: 6 MG/DL — LOW (ref 7–23)
CALCIUM SERPL-MCNC: 8.4 MG/DL — SIGNIFICANT CHANGE UP (ref 8.4–10.5)
CHLORIDE SERPL-SCNC: 106 MMOL/L — SIGNIFICANT CHANGE UP (ref 98–107)
CO2 SERPL-SCNC: 20 MMOL/L — LOW (ref 22–31)
CREAT SERPL-MCNC: 0.42 MG/DL — LOW (ref 0.5–1.3)
EOSINOPHIL # BLD AUTO: 0.25 K/UL — SIGNIFICANT CHANGE UP (ref 0–0.5)
EOSINOPHIL NFR BLD AUTO: 2 % — SIGNIFICANT CHANGE UP (ref 0–6)
GLUCOSE SERPL-MCNC: 71 MG/DL — SIGNIFICANT CHANGE UP (ref 70–99)
HCT VFR BLD CALC: 36.9 % — SIGNIFICANT CHANGE UP (ref 34.5–45)
HGB BLD-MCNC: 12 G/DL — SIGNIFICANT CHANGE UP (ref 11.5–15.5)
IMM GRANULOCYTES NFR BLD AUTO: 0.5 % — SIGNIFICANT CHANGE UP (ref 0–1.5)
LYMPHOCYTES # BLD AUTO: 26.7 % — SIGNIFICANT CHANGE UP (ref 13–44)
LYMPHOCYTES # BLD AUTO: 3.31 K/UL — HIGH (ref 1–3.3)
MAGNESIUM SERPL-MCNC: 2 MG/DL — SIGNIFICANT CHANGE UP (ref 1.6–2.6)
MCHC RBC-ENTMCNC: 29 PG — SIGNIFICANT CHANGE UP (ref 27–34)
MCHC RBC-ENTMCNC: 32.5 % — SIGNIFICANT CHANGE UP (ref 32–36)
MCV RBC AUTO: 89.1 FL — SIGNIFICANT CHANGE UP (ref 80–100)
MONOCYTES # BLD AUTO: 0.52 K/UL — SIGNIFICANT CHANGE UP (ref 0–0.9)
MONOCYTES NFR BLD AUTO: 4.2 % — SIGNIFICANT CHANGE UP (ref 2–14)
NEUTROPHILS # BLD AUTO: 8.24 K/UL — HIGH (ref 1.8–7.4)
NEUTROPHILS NFR BLD AUTO: 66.4 % — SIGNIFICANT CHANGE UP (ref 43–77)
NRBC # FLD: 0 K/UL — SIGNIFICANT CHANGE UP (ref 0–0)
PHOSPHATE SERPL-MCNC: 3 MG/DL — SIGNIFICANT CHANGE UP (ref 2.5–4.5)
PLATELET # BLD AUTO: 276 K/UL — SIGNIFICANT CHANGE UP (ref 150–400)
PMV BLD: 8.9 FL — SIGNIFICANT CHANGE UP (ref 7–13)
POTASSIUM SERPL-MCNC: 3.6 MMOL/L — SIGNIFICANT CHANGE UP (ref 3.5–5.3)
POTASSIUM SERPL-SCNC: 3.6 MMOL/L — SIGNIFICANT CHANGE UP (ref 3.5–5.3)
RBC # BLD: 4.14 M/UL — SIGNIFICANT CHANGE UP (ref 3.8–5.2)
RBC # FLD: 14.1 % — SIGNIFICANT CHANGE UP (ref 10.3–14.5)
SODIUM SERPL-SCNC: 136 MMOL/L — SIGNIFICANT CHANGE UP (ref 135–145)
WBC # BLD: 12.41 K/UL — HIGH (ref 3.8–10.5)
WBC # FLD AUTO: 12.41 K/UL — HIGH (ref 3.8–10.5)

## 2019-07-27 PROCEDURE — 99233 SBSQ HOSP IP/OBS HIGH 50: CPT | Mod: GC

## 2019-07-27 RX ORDER — DAPTOMYCIN 500 MG/10ML
373 INJECTION, POWDER, LYOPHILIZED, FOR SOLUTION INTRAVENOUS ONCE
Refills: 0 | Status: COMPLETED | OUTPATIENT
Start: 2019-07-27 | End: 2019-07-27

## 2019-07-27 RX ORDER — POTASSIUM CHLORIDE 20 MEQ
20 PACKET (EA) ORAL
Refills: 0 | Status: COMPLETED | OUTPATIENT
Start: 2019-07-27 | End: 2019-07-27

## 2019-07-27 RX ORDER — DAPTOMYCIN 500 MG/10ML
373 INJECTION, POWDER, LYOPHILIZED, FOR SOLUTION INTRAVENOUS ONCE
Refills: 0 | Status: COMPLETED | OUTPATIENT
Start: 2019-07-28 | End: 2019-07-28

## 2019-07-27 RX ADMIN — Medication 20 MILLIEQUIVALENT(S): at 12:11

## 2019-07-27 RX ADMIN — Medication 20 MILLIEQUIVALENT(S): at 09:29

## 2019-07-27 RX ADMIN — DAPTOMYCIN 114.92 MILLIGRAM(S): 500 INJECTION, POWDER, LYOPHILIZED, FOR SOLUTION INTRAVENOUS at 16:08

## 2019-07-27 NOTE — PROGRESS NOTE ADULT - SUBJECTIVE AND OBJECTIVE BOX
April Carter MD  [facility] Medicine [facility]   Pager: [phone] / [facility] [phone]    Subjective: The patient reports that the redness on her right leg is improving. Patient denies fever, chills, SOB, or chest pain. Reports that she feels well.     REVIEW OF SYSTEMS:  	CONSTITUTIONAL: No fever, chills, or fatigue  	EYES: No eye pain or visual disturbances  	ENT:  No difficulty hearing  	NECK: No pain or stiffness  	RESPIRATORY: No cough, wheezing, or hemoptysis  	CARDIOVASCULAR: No chest pain, palpitations or dizziness  	GASTROINTESTINAL: No abdominal or epigastric pain  	GENITOURINARY: No dysuria or frequency  	NEUROLOGICAL: No headaches, memory loss, or loss of strength  	SKIN: SEE HPI  	LYMPH NODES: No enlarged glands  	ENDOCRINE: No hot or cold intolerance  	MUSCULOSKELETAL: no joint pain   	PSYCHIATRIC: No depression, anxiety or difficulty sleeping  	HEME/LYMPH: No easy bruising or bleeding gums  ALLERGY AND IMMUNOLOGIC: No hives or eczema     MEDICATIONS  (STANDING):    MEDICATIONS  (PRN):    Physical Exam:   Vital Signs Last 24 Hrs  T(C): 36.7 (2019 06:23), Max: 36.8 (2019 21:21)  T(F): 98 (2019 06:23), Max: 98.3 (2019 21:21)  HR: 100 (2019 06:23) (90 - 100)  BP: 110/70 (2019 06:23) (110/70 - 124/79)  BP(mean): --  RR: 16 (2019 06:23) (16 - 18)  SpO2: 97% (2019 06:23) (96% - 97%)    General: Well-nourished, well-developed, NAD  	Head: Normocephalic, Atraumatic  	Eyes: PERRLA, EOMI, normal sclera  	Throat: Moist mucous membranes  	Respiratory: CTAB, normal respiratory effort, no wheezes, crackles, rales  	CV: Tachycardic, regular rhythm, S1/S2, no murmurs, rubs or gallops  	Abdominal: Soft, bowel sounds present, NT, ND +BS  	Extremities: right leg and right foot non-tender to palpation, 2+ DP pulses  	Neurological: A&Ox4, MAEx4, non-focal  Skin: blanching erythema extending from medial lower leg up to medial thigh, improving     Labs/Imagin.8   11.71 )-----------( 264      ( 2019 06:30 )             36.4         138  |  107  |  7   ----------------------------<  73  3.9   |  21<L>  |  0.45<L>    Ca    8.4      2019 06:30  Phos  3.3       Mg     2.0         TPro  6.3  /  Alb  3.3  /  TBili  < 0.2<L>  /  DBili  x   /  AST  16  /  ALT  25  /  AlkPhos  91      Culture - Blood (19 @ 10:43)    Culture - Blood:   NO ORGANISMS ISOLATED  NO ORGANISMS ISOLATED AT 48 HRS.    Specimen Source: BLOOD VENOUS          	IMAGING/STUDIES:    	< from: US Duplex Venous Lower Ext Ltd, Right (19 @ 12:18) >      	EXAM:  US DPLX LWR EXT VEINS LTD RT        	PROCEDURE DATE:  2019         	INTERPRETATION:  CLINICAL INFORMATION: Right leg pain and redness for 4   	days.    	COMPARISON: 2019.    	TECHNIQUE: Duplex sonography of the RIGHT LOWER extremity veins with   	color and spectral Doppler, with and without compression.      	FINDINGS:    	There is normal compressibility of the right common femoral, femoral and   	popliteal veins.     	The contralateral common femoral vein is patent.    	Doppler examination shows normal spontaneous and phasic flow.    	No calf vein thrombosis is detected.    	IMPRESSION:     	No evidence of right lower extremity deep venous thrombosis.    	< end of copied text > April Carter MD  [facility] Medicine [facility] [address]   Pager: [phone] / [facility] [phone]    Subjective: The patient reports that the redness on her right leg is improving. Patient denies fever, chills, SOB, chest pain, abdominal pain, or vaginal bleeding. Reports that she feels well.     REVIEW OF SYSTEMS:  	CONSTITUTIONAL: No fever, chills, or fatigue  	EYES: No eye pain or visual disturbances  	ENT:  No difficulty hearing  	NECK: No pain or stiffness  	RESPIRATORY: No cough, wheezing, or hemoptysis  	CARDIOVASCULAR: No chest pain, palpitations or dizziness  	GASTROINTESTINAL: No abdominal or epigastric pain  	GENITOURINARY: No dysuria or frequency  	NEUROLOGICAL: No headaches, memory loss, or loss of strength  	SKIN: SEE HPI  	LYMPH NODES: No enlarged glands  	ENDOCRINE: No hot or cold intolerance  	MUSCULOSKELETAL: no joint pain   	PSYCHIATRIC: No depression, anxiety or difficulty sleeping  	HEME/LYMPH: No easy bruising or bleeding gums  ALLERGY AND IMMUNOLOGIC: No hives or eczema     MEDICATIONS  (STANDING):    MEDICATIONS  (PRN):    Physical Exam:   Vital Signs Last 24 Hrs  T(C): 36.7 (27 Jul 2019 06:23), Max: 36.8 (26 Jul 2019 21:21)  T(F): 98 (27 Jul 2019 06:23), Max: 98.3 (26 Jul 2019 21:21)  HR: 100 (27 Jul 2019 06:23) (90 - 100)  BP: 110/70 (27 Jul 2019 06:23) (110/70 - 124/79)  BP(mean): --  RR: 16 (27 Jul 2019 06:23) (16 - 18)  SpO2: 97% (27 Jul 2019 06:23) (96% - 97%)    General: Well-nourished, well-developed, NAD  	Head: Normocephalic, Atraumatic  	Eyes: PERRLA, EOMI, normal sclera  	Throat: Moist mucous membranes  	Respiratory: CTAB, normal respiratory effort, no wheezes, crackles, rales  	CV: Tachycardic, regular rhythm, S1/S2, no murmurs, rubs or gallops  	Abdominal: Soft, bowel sounds present, NT, ND +BS  	Extremities: right leg and right foot non-tender to palpation, 2+ DP pulses  	Neurological: A&Ox4, MAEx4, non-focal  Skin: blanching erythema extending from medial lower leg up to medial thigh, improving     Labs/Imaging:                         CBC Full  -  ( 27 Jul 2019 06:10 )  WBC Count : 12.41 K/uL  RBC Count : 4.14 M/uL  Hemoglobin : 12.0 g/dL  Hematocrit : 36.9 %  Platelet Count - Automated : 276 K/uL  Mean Cell Volume : 89.1 fL  Mean Cell Hemoglobin : 29.0 pg  Mean Cell Hemoglobin Concentration : 32.5 %  Auto Neutrophil # : 8.24 K/uL  Auto Lymphocyte # : 3.31 K/uL  Auto Monocyte # : 0.52 K/uL  Auto Eosinophil # : 0.25 K/uL  Auto Basophil # : 0.03 K/uL  Auto Neutrophil % : 66.4 %  Auto Lymphocyte % : 26.7 %  Auto Monocyte % : 4.2 %  Auto Eosinophil % : 2.0 %  Auto Basophil % : 0.2 %    07-27    136  |  106  |  6<L>  ----------------------------<  71  3.6   |  20<L>  |  0.42<L>    Ca    8.4      27 Jul 2019 06:10  Phos  3.0     07-27  Mg     2.0     07-27    TPro  6.3  /  Alb  3.3  /  TBili  < 0.2<L>  /  DBili  x   /  AST  16  /  ALT  25  /  AlkPhos  91  07-26        Culture - Blood (07.23.19 @ 10:43)    Culture - Blood:   NO ORGANISMS ISOLATED  NO ORGANISMS ISOLATED AT 48 HRS.    Specimen Source: BLOOD VENOUS      	IMAGING/STUDIES:    	< from: US Duplex Venous Lower Ext Ltd, Right (07.23.19 @ 12:18) >      	EXAM:  US DPLX LWR EXT VEINS LTD RT        	PROCEDURE DATE:  Jul 23 2019         	INTERPRETATION:  CLINICAL INFORMATION: Right leg pain and redness for 4   	days.    	COMPARISON: July 19, 2019.    	TECHNIQUE: Duplex sonography of the RIGHT LOWER extremity veins with   	color and spectral Doppler, with and without compression.      	FINDINGS:    	There is normal compressibility of the right common femoral, femoral and   	popliteal veins.     	The contralateral common femoral vein is patent.    	Doppler examination shows normal spontaneous and phasic flow.    	No calf vein thrombosis is detected.    	IMPRESSION:     	No evidence of right lower extremity deep venous thrombosis.    	< end of copied text >

## 2019-07-27 NOTE — PROGRESS NOTE ADULT - PROBLEM SELECTOR PLAN 1
Patient presents again with persistent erythema and swelling of right leg. WBC elevated at 14.15 --> 12.5 --> 11.7. Was treated for cellulitis with clindamycin, but patient saw no relief. Right lower extremity doppler negative for DVT. BCXs with no growth.  - patient now with lymphangitis likely 2/2 spread from right foot cellulitis from ingrown toe nail  - was seen by Podiatry who noted no signs of infection to the right foot, but patient with right leg lymphangitis   - was seen by ID, who recommends Daptomycin for now given penicillin allergy. Pt is on Daptomycin 373 mg IV q24 (day 5/7). F/u ID recs, continue w/ Daptomycin   - Allergy and Immunology consulted for Keflex testing and desensitization: do not do skin testing on pregnant patients, suggested Keflex graded challenge. Pt refuses   - trend CBC  - monitor vital signs Patient presents again with persistent erythema and swelling of right leg. WBC elevated at 14.15 --> 12.5 --> 11.7 --> 12.4. Was treated for cellulitis with clindamycin, but patient saw no relief. Right lower extremity doppler negative for DVT. BCXs with no growth.  - patient now with lymphangitis likely 2/2 spread from right foot cellulitis from ingrown toe nail. Lymphangitis resolving   - was seen by Podiatry who noted no signs of infection to the right foot, but patient with right leg lymphangitis (resolving)   - was seen by ID, who recommends Daptomycin for now given penicillin allergy. Pt is on Daptomycin 373 mg IV q24 (day 5/7). F/u ID recs, continue w/ Daptomycin   - Allergy and Immunology consulted for Keflex testing and desensitization: do not do skin testing on pregnant patients, suggested Keflex graded challenge. Pt refuses   - trend CBC  - monitor vital signs

## 2019-07-28 LAB
BACTERIA BLD CULT: SIGNIFICANT CHANGE UP
BACTERIA BLD CULT: SIGNIFICANT CHANGE UP
BASOPHILS # BLD AUTO: 0.04 K/UL — SIGNIFICANT CHANGE UP (ref 0–0.2)
BASOPHILS NFR BLD AUTO: 0.3 % — SIGNIFICANT CHANGE UP (ref 0–2)
EOSINOPHIL # BLD AUTO: 0.23 K/UL — SIGNIFICANT CHANGE UP (ref 0–0.5)
EOSINOPHIL NFR BLD AUTO: 1.8 % — SIGNIFICANT CHANGE UP (ref 0–6)
HCT VFR BLD CALC: 37.5 % — SIGNIFICANT CHANGE UP (ref 34.5–45)
HGB BLD-MCNC: 12.3 G/DL — SIGNIFICANT CHANGE UP (ref 11.5–15.5)
IMM GRANULOCYTES NFR BLD AUTO: 0.5 % — SIGNIFICANT CHANGE UP (ref 0–1.5)
LYMPHOCYTES # BLD AUTO: 26.1 % — SIGNIFICANT CHANGE UP (ref 13–44)
LYMPHOCYTES # BLD AUTO: 3.27 K/UL — SIGNIFICANT CHANGE UP (ref 1–3.3)
MCHC RBC-ENTMCNC: 29 PG — SIGNIFICANT CHANGE UP (ref 27–34)
MCHC RBC-ENTMCNC: 32.8 % — SIGNIFICANT CHANGE UP (ref 32–36)
MCV RBC AUTO: 88.4 FL — SIGNIFICANT CHANGE UP (ref 80–100)
MONOCYTES # BLD AUTO: 0.54 K/UL — SIGNIFICANT CHANGE UP (ref 0–0.9)
MONOCYTES NFR BLD AUTO: 4.3 % — SIGNIFICANT CHANGE UP (ref 2–14)
NEUTROPHILS # BLD AUTO: 8.4 K/UL — HIGH (ref 1.8–7.4)
NEUTROPHILS NFR BLD AUTO: 67 % — SIGNIFICANT CHANGE UP (ref 43–77)
NRBC # FLD: 0 K/UL — SIGNIFICANT CHANGE UP (ref 0–0)
PLATELET # BLD AUTO: 275 K/UL — SIGNIFICANT CHANGE UP (ref 150–400)
PMV BLD: 9 FL — SIGNIFICANT CHANGE UP (ref 7–13)
RBC # BLD: 4.24 M/UL — SIGNIFICANT CHANGE UP (ref 3.8–5.2)
RBC # FLD: 14 % — SIGNIFICANT CHANGE UP (ref 10.3–14.5)
WBC # BLD: 12.54 K/UL — HIGH (ref 3.8–10.5)
WBC # FLD AUTO: 12.54 K/UL — HIGH (ref 3.8–10.5)

## 2019-07-28 PROCEDURE — 99232 SBSQ HOSP IP/OBS MODERATE 35: CPT | Mod: GC

## 2019-07-28 RX ORDER — DAPTOMYCIN 500 MG/10ML
373 INJECTION, POWDER, LYOPHILIZED, FOR SOLUTION INTRAVENOUS ONCE
Refills: 0 | Status: COMPLETED | OUTPATIENT
Start: 2019-07-29 | End: 2019-07-29

## 2019-07-28 RX ADMIN — DAPTOMYCIN 114.92 MILLIGRAM(S): 500 INJECTION, POWDER, LYOPHILIZED, FOR SOLUTION INTRAVENOUS at 12:28

## 2019-07-28 NOTE — PROGRESS NOTE ADULT - PROBLEM SELECTOR PLAN 2
- DVT: Low improve score, no need for pharmacologic prophylaxis. Ambulatory  - Diet: Regular  - Dispo: home

## 2019-07-28 NOTE — PROGRESS NOTE ADULT - PROBLEM SELECTOR PLAN 1
Patient presents again with persistent erythema and swelling of right leg. WBC elevated at 14.15 --> 12.5 --> 11.7 --> 12.4. Was treated for cellulitis with clindamycin, but patient saw no relief. Right lower extremity doppler negative for DVT. BCXs with no growth.  - patient now with lymphangitis likely 2/2 spread from right foot cellulitis from ingrown toe nail. Lymphangitis resolving   - was seen by Podiatry who noted no signs of infection to the right foot, but patient with right leg lymphangitis (resolving)   - was seen by ID, who recommends Daptomycin for now given penicillin allergy. Pt is on Daptomycin 373 mg IV q24 (day 6/7). F/u ID recs, continue w/ Daptomycin   - Allergy and Immunology consulted for Keflex testing and desensitization: do not do skin testing on pregnant patients, suggested Keflex graded challenge. Pt refuses   - trend CBC  - monitor vital signs

## 2019-07-28 NOTE — PROGRESS NOTE ADULT - SUBJECTIVE AND OBJECTIVE BOX
Dorinda Foster PGY 2  Pager: [phone]/ [phone]    Patient is a 29y old  Female who presents with a chief complaint of cellulitis (27 Jul 2019 06:48)      SUBJECTIVE / OVERNIGHT EVENTS:  Patient seen and examined at bedside. No acute events overnight. Patient reports some mild pain in thigh, and improved overall erythema. No fevers or chills overnight. Patient ambulating without pain.     Other Review of Systems Negative.    MEDICATIONS  (STANDING):  DAPTOmycin IVPB 373 milliGRAM(s) IV Intermittent once    MEDICATIONS  (PRN):      OBJECTIVE:    Vital Signs Last 24 Hrs  T(C): 36.3 (28 Jul 2019 06:00), Max: 36.7 (27 Jul 2019 14:49)  T(F): 97.4 (28 Jul 2019 06:00), Max: 98 (27 Jul 2019 14:49)  HR: 93 (28 Jul 2019 06:00) (93 - 99)  BP: 100/52 (28 Jul 2019 06:00) (100/52 - 118/65)  BP(mean): --  RR: 16 (28 Jul 2019 06:00) (16 - 17)  SpO2: 99% (28 Jul 2019 06:00) (99% - 100%)    CAPILLARY BLOOD GLUCOSE        I&O's Summary      PHYSICAL EXAM:  GENERAL: NAD, well-developed  HEAD:  Atraumatic, Normocephalic  EYES: EOMI, PERRLA, conjunctiva and sclera clear  NECK: Supple, No JVD  CHEST/LUNG: Clear to auscultation bilaterally; No wheeze  HEART: Regular rate and rhythm; No murmurs, rubs, or gallops  ABDOMEN: Soft, Nontender, Nondistended; Bowel sounds present  EXTREMITIES:  2+ Peripheral Pulses, markedly decreased erythema, some tenderness to palpation over medial thigh  PSYCH: AAOx3  NEUROLOGY: non-focal  SKIN: No rashes or lesions    LABS:                        12.3   12.54 )-----------( 275      ( 28 Jul 2019 05:40 )             37.5     Auto Eosinophil # 0.23  / Auto Eosinophil % 1.8   / Auto Neutrophil # 8.40  / Auto Neutrophil % 67.0  / BANDS % x                            12.0   12.41 )-----------( 276      ( 27 Jul 2019 06:10 )             36.9     Auto Eosinophil # 0.25  / Auto Eosinophil % 2.0   / Auto Neutrophil # 8.24  / Auto Neutrophil % 66.4  / BANDS % x        07-27    136  |  106  |  6<L>  ----------------------------<  71  3.6   |  20<L>  |  0.42<L>    Ca    8.4      27 Jul 2019 06:10  Mg     2.0     07-27  Phos  3.0     07-27

## 2019-07-29 ENCOUNTER — TRANSCRIPTION ENCOUNTER (OUTPATIENT)
Age: 29
End: 2019-07-29

## 2019-07-29 VITALS
OXYGEN SATURATION: 100 % | DIASTOLIC BLOOD PRESSURE: 67 MMHG | RESPIRATION RATE: 16 BRPM | TEMPERATURE: 98 F | HEART RATE: 88 BPM | SYSTOLIC BLOOD PRESSURE: 103 MMHG

## 2019-07-29 LAB
BASOPHILS # BLD AUTO: 0.03 K/UL — SIGNIFICANT CHANGE UP (ref 0–0.2)
BASOPHILS NFR BLD AUTO: 0.2 % — SIGNIFICANT CHANGE UP (ref 0–2)
EOSINOPHIL # BLD AUTO: 0.19 K/UL — SIGNIFICANT CHANGE UP (ref 0–0.5)
EOSINOPHIL NFR BLD AUTO: 1.5 % — SIGNIFICANT CHANGE UP (ref 0–6)
HCT VFR BLD CALC: 39.7 % — SIGNIFICANT CHANGE UP (ref 34.5–45)
HGB BLD-MCNC: 12.8 G/DL — SIGNIFICANT CHANGE UP (ref 11.5–15.5)
IMM GRANULOCYTES NFR BLD AUTO: 0.5 % — SIGNIFICANT CHANGE UP (ref 0–1.5)
LYMPHOCYTES # BLD AUTO: 24.5 % — SIGNIFICANT CHANGE UP (ref 13–44)
LYMPHOCYTES # BLD AUTO: 3.03 K/UL — SIGNIFICANT CHANGE UP (ref 1–3.3)
MCHC RBC-ENTMCNC: 29 PG — SIGNIFICANT CHANGE UP (ref 27–34)
MCHC RBC-ENTMCNC: 32.2 % — SIGNIFICANT CHANGE UP (ref 32–36)
MCV RBC AUTO: 89.8 FL — SIGNIFICANT CHANGE UP (ref 80–100)
MONOCYTES # BLD AUTO: 0.52 K/UL — SIGNIFICANT CHANGE UP (ref 0–0.9)
MONOCYTES NFR BLD AUTO: 4.2 % — SIGNIFICANT CHANGE UP (ref 2–14)
NEUTROPHILS # BLD AUTO: 8.54 K/UL — HIGH (ref 1.8–7.4)
NEUTROPHILS NFR BLD AUTO: 69.1 % — SIGNIFICANT CHANGE UP (ref 43–77)
NRBC # FLD: 0 K/UL — SIGNIFICANT CHANGE UP (ref 0–0)
PLATELET # BLD AUTO: 294 K/UL — SIGNIFICANT CHANGE UP (ref 150–400)
PMV BLD: 9 FL — SIGNIFICANT CHANGE UP (ref 7–13)
RBC # BLD: 4.42 M/UL — SIGNIFICANT CHANGE UP (ref 3.8–5.2)
RBC # FLD: 14 % — SIGNIFICANT CHANGE UP (ref 10.3–14.5)
WBC # BLD: 12.37 K/UL — HIGH (ref 3.8–10.5)
WBC # FLD AUTO: 12.37 K/UL — HIGH (ref 3.8–10.5)

## 2019-07-29 PROCEDURE — 99239 HOSP IP/OBS DSCHRG MGMT >30: CPT

## 2019-07-29 RX ADMIN — DAPTOMYCIN 114.92 MILLIGRAM(S): 500 INJECTION, POWDER, LYOPHILIZED, FOR SOLUTION INTRAVENOUS at 10:13

## 2019-07-29 NOTE — PROGRESS NOTE ADULT - PROBLEM SELECTOR PROBLEM 1
Cellulitis of foot, right

## 2019-07-29 NOTE — PROGRESS NOTE ADULT - PROBLEM SELECTOR PLAN 1
Patient presents again with persistent erythema and swelling of right leg. WBC elevated at 14.15 --> 12.5 --> 11.7 --> 12.4 --> 12.5. Was treated for cellulitis with clindamycin, but patient saw no relief. Right lower extremity doppler negative for DVT. BCXs with no growth.  - patient now with lymphangitis likely 2/2 spread from right foot cellulitis from ingrown toe nail. Lymphangitis resolving   - was seen by Podiatry who noted no signs of infection to the right foot, but patient with right leg lymphangitis (resolving)   - was seen by ID, who recommends Daptomycin for now given penicillin allergy. Pt is on Daptomycin 373 mg IV q24 (day 7/7). F/u ID recs, continue w/ Daptomycin   - Allergy and Immunology consulted for Keflex testing and desensitization: do not do skin testing on pregnant patients, suggested Keflex graded challenge. Pt refuses   - trend CBC  - monitor vital signs Patient presents again with persistent erythema and swelling of right leg. WBC elevated at 14.15 --> 12.5 --> 11.7 --> 12.4 --> 12.5. Was treated for cellulitis with clindamycin, but patient saw no relief. Right lower extremity doppler negative for DVT. BCXs with no growth.  - patient now with lymphangitis likely 2/2 spread from right foot cellulitis from ingrown toe nail. Lymphangitis resolving   - was seen by Podiatry who noted no signs of infection to the right foot, but patient with right leg lymphangitis (resolving)   - was seen by ID, who recommends Daptomycin for now given penicillin allergy. Pt is on Daptomycin 373 mg IV q24 (day 7/7). F/u ID recs, continue w/ Daptomycin   - Allergy and Immunology consulted for Keflex testing and desensitization: do not do skin testing on pregnant patients, suggested Keflex graded challenge. Pt refuses   - trend CBC  - monitor vital signs  - Pt has ID follow up appt tomorrow w/ Dr. Kee

## 2019-07-29 NOTE — PROGRESS NOTE ADULT - ASSESSMENT
29 year old female who is 20 weeks pregnant with no PMH who recently presented to the ED with right leg redness presents again with complaint of persistent redness and pain of right leg, admitted for treatment of cellulitis.
29 year old female who is 20 weeks pregnant with no PMH who recently presented to the ED with right leg redness presents again with complaint of persistent redness and pain of right leg, admitted for treatment of cellulitis.
28 yo woman, who is 20 weeks pregnant with cellulitis right foot probably related to ingrown toenail with lymphangitic spread right leg.  Infection improving significantly today.   No signs of systemic toxicity.  Leucocytosis resolving.   h/o allergy to penicillin.  reports anaphylaxis as infant.      Options for antibiotic choice limited by pregnancy and antibiotic allergy.    culture blood no growth x 48 h.    appreciate help from allergy service evaluating antibiotic allergy.        suggest;    continue Daptomycin 350 mg iv q 24 h    if tolerates cephalosporin challenge can complete treatment with keflex 500 mg po q 6 h x 5 days.     I will be away 7/26.  ID service available for questions.    Ama Kee MD  Pager: [phone]  After 5 PM or weekends please call fellow on call or office [phone]
29 year old female who is 20 weeks pregnant with no PMH who recently presented to the ED with right leg redness presents again with complaint of persistent redness and pain of right leg, admitted for treatment of cellulitis.
30 yo woman, who is 20 weeks pregnant with cellulitis right foot probably related to ingrown toenail with lymphangitic spread right leg.  Infection evolving; decreased signs of inflammation lower leg, increased upper.  No signs of systemic toxicity.  Leucocytosis.  h/o allergy to penicillin.  reports anaphylaxis as infant.    Options for antibiotic choice limited by pregnancy and antibiotic allergy.    culture blood no growth to date.    podiatry evaluation - no drain necessary        suggest;    continue Daptomycin 350 mg iv q 24 h  allergy consult for testing to  cephalosporin (keflex)    Ama Kee MD  Pager: [phone]  After 5 PM or weekends please call fellow on call or office [phone]
29 year old female who is 20 weeks pregnant with no PMH who recently presented to the ED with right leg redness presents  again with complaint of persistent redness and pain of right leg, admitted for treatment of cellulitis.
29 year old female who is 20 weeks pregnant with no PMH who recently presented to the ED with right leg redness presents again with complaint of persistent redness and pain of right leg, admitted for treatment of cellulitis.

## 2019-07-29 NOTE — DISCHARGE NOTE NURSING/CASE MANAGEMENT/SOCIAL WORK - NSDCDPATPORTLINK_GEN_ALL_CORE
You can access the [email or web address], offered by [facility], by registering with the following website: [email or web address]

## 2019-07-29 NOTE — PROGRESS NOTE ADULT - REASON FOR ADMISSION
cellulitis
cellulitis with lymphangitic spread
cellulitis with lymphangitic spread
cellulitis

## 2019-07-29 NOTE — PROGRESS NOTE ADULT - PROBLEM SELECTOR PLAN 2
- DVT: Low improve score, no need for pharmacologic prophylaxis. Ambulatory  - Diet: Regular  - Dispo: home - DVT: Low improve score, no need for pharmacologic prophylaxis. Ambulatory  - Diet: Regular  - Dispo: home, f/u w/ ID tomorrow

## 2019-07-29 NOTE — DISCHARGE NOTE NURSING/CASE MANAGEMENT/SOCIAL WORK - NSDCPNINST_GEN_ALL_CORE
Ms Major is stable for discharged, alert and oriented x 4, self sufficient with her care. The left Leg  is less swollen and painful.  The redness has subsided and she did receive her last dose of IV Antibiotic.  The Discharge Instructions are discussed and provided to her.

## 2019-07-29 NOTE — CHART NOTE - NSCHARTNOTEFT_GEN_A_CORE
Alerted to patient's BP 89/52. BPs have ranged 100-124/50-79 over past 3 days. Asked to re-check BP, was 98/61. HR 85. Prior to first check, patient had been sleeping. She was asymptomatic and remained so. Went to see patient, she was lying flat. Denied pain, headache, dizziness, shortness of breath, chest pain, abdominal pain, or bleeding.     T98.2, SpO2 99% on RA.  General: Comfortable woman lying supine.  CV: RRR, no m/r/g, radial pulses 2+ and equal bilaterally  Lungs: CTAB  Right lower extremity: Erythema within lines drawn on inner thigh.    A/P: Asymptomatic hypotension, likely related to sleeping state. Will recheck vitals at 0530, in 2.5h.

## 2019-07-29 NOTE — PROGRESS NOTE ADULT - SUBJECTIVE AND OBJECTIVE BOX
April Carter MD  [facility] CMA   Pager: [phone] / [phone]     Patient is a 29y old  Female who presents with a chief complaint of cellulitis (28 Jul 2019 09:45)    SUBJECTIVE / OVERNIGHT EVENTS:  Patient seen and examined at bedside. BP 89/52 overnight, pt was asymptomatic. BP improved to 103/67.  Patient reports some mild pain in right thigh, and improved overall erythema. No fevers or chills overnight. Patient ambulating without pain.     REVIEW OF SYSTEMS:  CONSTITUTIONAL: No fever, chills, night sweats, or fatigue  EYES: No eye pain, visual disturbances, or discharge  ENMT:  No difficulty hearing, tinnitus, vertigo; No sinus or throat pain  NECK: No pain or stiffness  BREASTS: No pain, masses, or nipple discharge  RESPIRATORY: No cough, wheezing, or hemoptysis; No shortness of breath  CARDIOVASCULAR: No chest pain, palpitations, dizziness, or leg swelling  GASTROINTESTINAL: No abdominal or epigastric pain. No nausea, vomiting, or hematemesis; No diarrhea or constipation. No melena or hematochezia.  GENITOURINARY: No dysuria, frequency, hematuria, or incontinence  NEUROLOGICAL: No headaches, memory loss, loss of strength, numbness, or tremors  SKIN: No itching, burning, rashes, or lesions   LYMPH NODES: No enlarged glands  ENDOCRINE: No heat or cold intolerance; No hair loss  MUSCULOSKELETAL: No joint pain or swelling; No muscle, back, or extremity pain  PSYCHIATRIC: No depression, anxiety, mood swings, or difficulty sleeping  HEME/LYMPH: No easy bruising, or bleeding gums  ALLERY AND IMMUNOLOGIC: No hives or eczema.    MEDICATIONS  (STANDING):  DAPTOmycin IVPB 373 milliGRAM(s) IV Intermittent once    MEDICATIONS  (PRN):      OBJECTIVE:    Vital Signs Last 24 Hrs  T(C): 36.8 (29 Jul 2019 05:59), Max: 36.8 (29 Jul 2019 02:15)  T(F): 98.3 (29 Jul 2019 05:59), Max: 98.3 (29 Jul 2019 05:59)  HR: 88 (29 Jul 2019 05:59) (85 - 95)  BP: 103/67 (29 Jul 2019 05:59) (89/52 - 123/76)  BP(mean): --  RR: 16 (29 Jul 2019 05:59) (16 - 20)  SpO2: 100% (29 Jul 2019 05:59) (98% - 100%)    PHYSICAL EXAM:  GENERAL: NAD, well-developed  HEAD:  Atraumatic, Normocephalic  EYES: EOMI, PERRLA, conjunctiva and sclera clear  NECK: Supple, No JVD  CHEST/LUNG: Clear to auscultation bilaterally; No wheeze  HEART: Regular rate and rhythm; No murmurs, rubs, or gallops  ABDOMEN: Soft, Nontender, Nondistended; Bowel sounds present  EXTREMITIES:  2+ Peripheral Pulses, markedly decreased erythema, some tenderness to palpation over medial thigh  PSYCH: AAOx3  NEUROLOGY: non-focal  SKIN: No rashes or lesions    LABS:                        12.3   12.54 )-----------( 275      ( 28 Jul 2019 05:40 )             37.5     Auto Eosinophil # 0.23  / Auto Eosinophil % 1.8   / Auto Neutrophil # 8.40  / Auto Neutrophil % 67.0  / BANDS % x                            12.0   12.41 )-----------( 276      ( 27 Jul 2019 06:10 )             36.9     Auto Eosinophil # 0.25  / Auto Eosinophil % 2.0   / Auto Neutrophil # 8.24  / Auto Neutrophil % 66.4  / BANDS % x        07-27    136  |  106  |  6<L>  ----------------------------<  71  3.6   |  20<L>  |  0.42<L>    Ca    8.4      27 Jul 2019 06:10  Mg     2.0     07-27  Phos  3.0     07-27 Arpil Carter MD  [facility] CMA   Pager: [phone] / [phone]     Patient is a 29y old  Female who presents with a chief complaint of cellulitis (28 Jul 2019 09:45)    SUBJECTIVE / OVERNIGHT EVENTS:  Patient seen and examined at bedside. BP 89/52 overnight, pt was asymptomatic. BP improved to 103/67.  Patient reports some mild pain in right thigh, and improved overall erythema. No fevers or chills overnight. Patient ambulating without pain.     REVIEW OF SYSTEMS:  CONSTITUTIONAL: No fever, chills, night sweats, or fatigue  EYES: No eye pain, visual disturbances, or discharge  ENMT:  No difficulty hearing, tinnitus, vertigo; No sinus or throat pain  NECK: No pain or stiffness  BREASTS: No pain, masses, or nipple discharge  RESPIRATORY: No cough, wheezing, or hemoptysis; No shortness of breath  CARDIOVASCULAR: No chest pain, palpitations, dizziness, or leg swelling  GASTROINTESTINAL: No abdominal or epigastric pain. No nausea, vomiting, or hematemesis; No diarrhea or constipation. No melena or hematochezia.  GENITOURINARY: No dysuria, frequency, hematuria, or incontinence  NEUROLOGICAL: No headaches, memory loss, loss of strength, numbness, or tremors  SKIN: No itching, burning, rashes, or lesions   LYMPH NODES: No enlarged glands  ENDOCRINE: No heat or cold intolerance; No hair loss  MUSCULOSKELETAL: No joint pain or swelling; No muscle, back, or extremity pain  PSYCHIATRIC: No depression, anxiety, mood swings, or difficulty sleeping  HEME/LYMPH: No easy bruising, or bleeding gums  ALLERY AND IMMUNOLOGIC: No hives or eczema.    MEDICATIONS  (STANDING):  DAPTOmycin IVPB 373 milliGRAM(s) IV Intermittent once    MEDICATIONS  (PRN):      OBJECTIVE:    Vital Signs Last 24 Hrs  T(C): 36.8 (29 Jul 2019 05:59), Max: 36.8 (29 Jul 2019 02:15)  T(F): 98.3 (29 Jul 2019 05:59), Max: 98.3 (29 Jul 2019 05:59)  HR: 88 (29 Jul 2019 05:59) (85 - 95)  BP: 103/67 (29 Jul 2019 05:59) (89/52 - 123/76)  BP(mean): --  RR: 16 (29 Jul 2019 05:59) (16 - 20)  SpO2: 100% (29 Jul 2019 05:59) (98% - 100%)    PHYSICAL EXAM:  GENERAL: NAD, well-developed  HEAD:  Atraumatic, Normocephalic  EYES: EOMI, PERRLA, conjunctiva and sclera clear  NECK: Supple, No JVD  CHEST/LUNG: Clear to auscultation bilaterally; No wheeze  HEART: Regular rate and rhythm; No murmurs, rubs, or gallops  ABDOMEN: Soft, Nontender, Nondistended; Bowel sounds present  EXTREMITIES:  2+ Peripheral Pulses, markedly decreased erythema, some tenderness to palpation over medial thigh  PSYCH: AAOx3  NEUROLOGY: non-focal  SKIN: No rashes or lesions    LABS:                        12.8   12.37 )-----------( 294      ( 29 Jul 2019 05:30 )             39.7                           12.3   12.54 )-----------( 275      ( 28 Jul 2019 05:40 )             37.5     Auto Eosinophil # 0.23  / Auto Eosinophil % 1.8   / Auto Neutrophil # 8.40  / Auto Neutrophil % 67.0  / BANDS % x                            12.0   12.41 )-----------( 276      ( 27 Jul 2019 06:10 )             36.9     Auto Eosinophil # 0.25  / Auto Eosinophil % 2.0   / Auto Neutrophil # 8.24  / Auto Neutrophil % 66.4  / BANDS % x        07-27    136  |  106  |  6<L>  ----------------------------<  71  3.6   |  20<L>  |  0.42<L>    Ca    8.4      27 Jul 2019 06:10  Mg     2.0     07-27  Phos  3.0     07-27

## 2019-07-29 NOTE — PROGRESS NOTE ADULT - ATTENDING COMMENTS
RLE cellulitis noted to be much improved - per patient and  at the bedside. Patient will f/u with Dr. Kee tomorrow in the office. Discussed will need follow-up to ensure that the cellulitis continues to get better. Medically stable for discharge home.    35 min discharge time.

## 2019-07-29 NOTE — DISCHARGE NOTE NURSING/CASE MANAGEMENT/SOCIAL WORK - NSDCFUADDAPPT_GEN_ALL_CORE_FT
Please follow up with your primary medical doctor in 1 week.     Please follow up with Allergy and Immunology after your pregnancy to do penicillin skin testing. You can call [phone] to set up an appointment.

## 2019-07-30 ENCOUNTER — APPOINTMENT (OUTPATIENT)
Dept: INFECTIOUS DISEASE | Facility: CLINIC | Age: 29
End: 2019-07-30
Payer: COMMERCIAL

## 2019-07-30 VITALS
BODY MASS INDEX: 38.14 KG/M2 | SYSTOLIC BLOOD PRESSURE: 123 MMHG | WEIGHT: 202 LBS | HEIGHT: 61 IN | TEMPERATURE: 98.6 F | HEART RATE: 114 BPM | RESPIRATION RATE: 16 BRPM | DIASTOLIC BLOOD PRESSURE: 79 MMHG | OXYGEN SATURATION: 96 %

## 2019-07-30 DIAGNOSIS — Z78.9 OTHER SPECIFIED HEALTH STATUS: ICD-10-CM

## 2019-07-30 PROCEDURE — 99214 OFFICE O/P EST MOD 30 MIN: CPT

## 2019-08-01 ENCOUNTER — ASOB RESULT (OUTPATIENT)
Age: 29
End: 2019-08-01

## 2019-08-01 ENCOUNTER — APPOINTMENT (OUTPATIENT)
Dept: ANTEPARTUM | Facility: CLINIC | Age: 29
End: 2019-08-01
Payer: COMMERCIAL

## 2019-08-01 PROCEDURE — 76811 OB US DETAILED SNGL FETUS: CPT

## 2019-08-13 ENCOUNTER — APPOINTMENT (OUTPATIENT)
Dept: INFECTIOUS DISEASE | Facility: CLINIC | Age: 29
End: 2019-08-13
Payer: COMMERCIAL

## 2019-08-13 VITALS
BODY MASS INDEX: 38.71 KG/M2 | HEIGHT: 61 IN | OXYGEN SATURATION: 100 % | DIASTOLIC BLOOD PRESSURE: 71 MMHG | WEIGHT: 205 LBS | RESPIRATION RATE: 18 BRPM | TEMPERATURE: 98.1 F | HEART RATE: 102 BPM | SYSTOLIC BLOOD PRESSURE: 118 MMHG

## 2019-08-13 PROCEDURE — 99213 OFFICE O/P EST LOW 20 MIN: CPT

## 2019-08-14 PROBLEM — Z78.9 KNOWN HEALTH PROBLEMS: NONE: Status: RESOLVED | Noted: 2019-08-14 | Resolved: 2019-08-14

## 2019-08-14 PROBLEM — Z78.9 NO PERTINENT PAST MEDICAL HISTORY: Status: RESOLVED | Noted: 2019-08-14 | Resolved: 2019-08-14

## 2019-08-14 RX ORDER — MULTIVITAMIN,THER AND MINERALS
TABLET ORAL
Refills: 0 | Status: ACTIVE | COMMUNITY

## 2019-08-14 NOTE — HISTORY OF PRESENT ILLNESS
[FreeTextEntry1] : 30 yo woman who is 20 weeks pregnant who presented to [facility] with cellulitis right foot with lymphangitic spread after pedicure and clipping ingrown toenail.  she was initially treated with clindamycin; infection initially improved but then became more red and painful.  \par h/o penicillin allergy - anaphylaxis as baby\par treated with daptomycin with good effect.\par evaluated by allergy- skin testing, oral challenge deferred.\par Feels well now.Has some concern about residual swelling right foot and redness leg but no pain or increased warmth.\par \par

## 2019-08-14 NOTE — ASSESSMENT
[FreeTextEntry1] : 30 yo woman who is 23 weeks pregnant who presented to [facility] with right foot cellulitis with lymphangitic spread  following pedicure and clipping of ingrown toenail.  infection worsened despite treatment with clindamycin po. \par she received daptomycin iv x 7 days \par continues improvement on exam today.  no erythema n area of prior infection.  \par h/o penicillin allergy -anaphylaxis as baby.  evaluated by allergy. skin testing and oral challenge deferred until after pregnancy.\par plan; observe off antibiotics\par          plans to f/u with allergy after delivery\par          call if further questions/ concerns.\par

## 2019-08-14 NOTE — PHYSICAL EXAM
[General Appearance - Well-Appearing] : healthy appearing [Sclera] : the sclera and conjunctiva were normal [Oropharynx] : the oropharynx was normal with no thrush [Respiration, Rhythm And Depth] : normal respiratory rhythm and effort [Auscultation Breath Sounds / Voice Sounds] : lungs were clear to auscultation bilaterally [Heart Rate And Rhythm] : heart rate was normal and rhythm regular [Bowel Sounds] : normal bowel sounds [Heart Sounds] : normal S1 and S2 [Abdomen Soft] : soft [Abdomen Tenderness] : non-tender [Motor Tone] : muscle strength and tone were normal [Motor Exam] : the motor exam was normal [FreeTextEntry1] : faint erythema linear right leg, no increased warmth, or tenderness

## 2019-08-14 NOTE — ASSESSMENT
[FreeTextEntry1] : 28 yo woman who is 21 weeks pregnant who presented to [facility] with right foot cellulitis with lymphangitic spread  following pedicure and clipping of ingrown toenail.  infection worsened despite treatment with clindamycin po. \par she received daptomycin iv\par marked improvement on exam today.  faint erythema n area of prior infection.  may resolve slowly.\par h/o penicillin allergy -anaphylaxis as baby.  evaluated by allergy. skin testing and oral challenge deferred until after pregnancy.\par plan; observe off antibiotics\par          no pedicures\par          return 2 weeks\par         advised f/u with allergy.\par

## 2019-08-14 NOTE — HISTORY OF PRESENT ILLNESS
[FreeTextEntry1] : Feels well.\par 23 weeks pregnant now.\par   right leg redness has improved.  'no longer feels swollen.\par using compression stockings.\par had medical pedicure in her podiatrist's office.\par no fever, chills, diarrhea.

## 2019-08-14 NOTE — PHYSICAL EXAM
[General Appearance - Well-Appearing] : healthy appearing [Sclera] : the sclera and conjunctiva were normal [Oropharynx] : the oropharynx was normal with no thrush [] : no respiratory distress [Heart Rate And Rhythm] : heart rate was normal and rhythm regular [Heart Sounds] : normal S1 and S2 [Full Pulse] : the pedal pulses are present [Edema] : there was no peripheral edema [Musculoskeletal - Swelling] : no joint swelling [Nail Clubbing] : no clubbing  or cyanosis of the fingernails [Motor Tone] : muscle strength and tone were normal [Skin Color & Pigmentation] : normal skin color and pigmentation [FreeTextEntry1] : right leg no erythema, swellin, induration [Oriented To Time, Place, And Person] : oriented to person, place, and time [Affect] : the affect was normal

## 2019-08-15 ENCOUNTER — APPOINTMENT (OUTPATIENT)
Dept: ANTEPARTUM | Facility: CLINIC | Age: 29
End: 2019-08-15
Payer: COMMERCIAL

## 2019-08-15 ENCOUNTER — ASOB RESULT (OUTPATIENT)
Age: 29
End: 2019-08-15

## 2019-08-15 PROCEDURE — 76816 OB US FOLLOW-UP PER FETUS: CPT

## 2019-08-29 ENCOUNTER — TRANSCRIPTION ENCOUNTER (OUTPATIENT)
Age: 29
End: 2019-08-29

## 2019-10-17 ENCOUNTER — ASOB RESULT (OUTPATIENT)
Age: 29
End: 2019-10-17

## 2019-10-17 ENCOUNTER — APPOINTMENT (OUTPATIENT)
Dept: ANTEPARTUM | Facility: CLINIC | Age: 29
End: 2019-10-17
Payer: COMMERCIAL

## 2019-10-17 PROCEDURE — 76816 OB US FOLLOW-UP PER FETUS: CPT

## 2019-11-26 ENCOUNTER — OUTPATIENT (OUTPATIENT)
Dept: INPATIENT UNIT | Facility: HOSPITAL | Age: 29
LOS: 1 days | Discharge: HOME | End: 2019-11-26
Payer: COMMERCIAL

## 2019-11-26 VITALS
TEMPERATURE: 99 F | SYSTOLIC BLOOD PRESSURE: 127 MMHG | RESPIRATION RATE: 16 BRPM | HEART RATE: 97 BPM | DIASTOLIC BLOOD PRESSURE: 82 MMHG

## 2019-11-26 DIAGNOSIS — Z3A.00 WEEKS OF GESTATION OF PREGNANCY NOT SPECIFIED: ICD-10-CM

## 2019-11-26 DIAGNOSIS — O26.899 OTHER SPECIFIED PREGNANCY RELATED CONDITIONS, UNSPECIFIED TRIMESTER: ICD-10-CM

## 2019-11-26 PROCEDURE — 59025 FETAL NON-STRESS TEST: CPT | Mod: 26

## 2019-11-26 NOTE — OB PROVIDER TRIAGE NOTE - ADDITIONAL INSTRUCTIONS
Pt cleared for discharge home  Labor precautions reviewed  Kick counts reviewed  Pt instructed to follow up with next scheduled appointment  Verbal and written discharge instructions given

## 2019-11-26 NOTE — OB PROVIDER TRIAGE NOTE - HISTORY OF PRESENT ILLNESS
Pt of Dr. Spears  female 30 y/o  @38.2 weeks gestation presents to triage from Ob office with c/o decreased fetal movement. Pt states she has been feeling positive fetal movement since leaving the OB office. Pt denies CTX, LOF, VB. Current Ap course significant for: Positive GBS  Medical H/x: Denies  Surgical H/x Denies  Ob/Gyn H/x: Denies  Psych H/x: Denies  Meds: Pnv  Allergies: Penicillin- Anaphylaxis, Robitussin- Anaphylaxis

## 2019-11-26 NOTE — OB PROVIDER TRIAGE NOTE - NSHPPHYSICALEXAM_GEN_ALL_CORE
Bp: 127/82 Hr:97 T:37.1  Abdomen: Soft, non-tender on palpation  NST: Category 1  [place]: No ctx  TAUS: Cephalic presentation, Bpp:8/8, Connie:10.47 Bp: 127/82 Hr:97 T:37.1  Abdomen: Soft, non-tender on palpation  NST: Category 1  [place]: No ctx  TAUS: Anterior placenta, Cephalic presentation, Bpp:8/8, Connie:10.47

## 2019-11-26 NOTE — OB PROVIDER TRIAGE NOTE - NSOBPROVIDERNOTE_OBGYN_ALL_OB_FT
Reassuring maternal fetal status. Discussed with Dr. Spears   Pt cleared for discharge home  Labor precautions reviewed  Kick counts reviewed  Pt instructed to follow up with next scheduled appointment  Verbal and written discharge instructions given

## 2019-12-09 ENCOUNTER — OUTPATIENT (OUTPATIENT)
Dept: INPATIENT UNIT | Facility: HOSPITAL | Age: 29
LOS: 1 days | Discharge: HOME | End: 2019-12-09
Payer: COMMERCIAL

## 2019-12-09 VITALS
HEART RATE: 92 BPM | TEMPERATURE: 98 F | RESPIRATION RATE: 18 BRPM | SYSTOLIC BLOOD PRESSURE: 137 MMHG | DIASTOLIC BLOOD PRESSURE: 80 MMHG

## 2019-12-09 VITALS — SYSTOLIC BLOOD PRESSURE: 110 MMHG | HEART RATE: 88 BPM | DIASTOLIC BLOOD PRESSURE: 67 MMHG

## 2019-12-09 DIAGNOSIS — O26.899 OTHER SPECIFIED PREGNANCY RELATED CONDITIONS, UNSPECIFIED TRIMESTER: ICD-10-CM

## 2019-12-09 DIAGNOSIS — Z3A.00 WEEKS OF GESTATION OF PREGNANCY NOT SPECIFIED: ICD-10-CM

## 2019-12-09 PROCEDURE — 59025 FETAL NON-STRESS TEST: CPT | Mod: 26

## 2019-12-09 NOTE — OB PROVIDER TRIAGE NOTE - HISTORY OF PRESENT ILLNESS
29 yr old  @ 40.1 wk. Presents with reports of fetal tachycardia on 10 minutes NST in office. Denies VB/LOF/Ctx. Reports positive fetal movement.   PNI: denies

## 2019-12-09 NOTE — OB PROVIDER TRIAGE NOTE - NSOBPROVIDERNOTE_OBGYN_ALL_OB_FT
29 yr old  @ 40.1 wk. Presents with reports of fetal tachycardia on 10 minutes NST in office. Denies VB/LOF/Ctx. Reports positive fetal movement.   PNI: denies    VS: 110/67  TOCo: ctx irregular  NST:  +accels, -decels. moderate variability 29 yr old  @ 40.1 wk. Presents with reports of fetal tachycardia on 10 minutes NST in office. Denies VB/LOF/Ctx. Reports positive fetal movement.   PNI: denies    VS: 110/67  TOCo: ctx irregular  NST:  +accels, -decels. moderate variability    @ 7:52 pm  CAT 1 tracing  TOCo: ctx irregular  VE: in office L/C/P x 4 hrs ago  SOno: BPP 8/8, MICHELE 8. 5. Anterior placenta, Vertex presentation 29 yr old  @ 40.1 wk. Presents with reports of fetal tachycardia on 10 minutes NST in office. Denies VB/LOF/Ctx. Reports positive fetal movement.   PNI: denies    VS: 110/67  TOCo: ctx irregular  NST:  +accels, -decels. moderate variability    @ 7:52 pm  CAT 1 tracing  TOCo: ctx irregular  VE: in office L/C/P x 4 hrs ago  SOno: BPP 8/8, MICHELE 8. 5. Anterior placenta, Vertex presentation  Discussed with Dr. Pan

## 2019-12-11 ENCOUNTER — INPATIENT (INPATIENT)
Facility: HOSPITAL | Age: 29
LOS: 3 days | Discharge: HOME | End: 2019-12-15
Attending: OBSTETRICS & GYNECOLOGY | Admitting: OBSTETRICS & GYNECOLOGY
Payer: COMMERCIAL

## 2019-12-11 ENCOUNTER — TRANSCRIPTION ENCOUNTER (OUTPATIENT)
Age: 29
End: 2019-12-11

## 2019-12-11 VITALS
SYSTOLIC BLOOD PRESSURE: 134 MMHG | TEMPERATURE: 98 F | HEART RATE: 88 BPM | RESPIRATION RATE: 18 BRPM | DIASTOLIC BLOOD PRESSURE: 84 MMHG

## 2019-12-11 DIAGNOSIS — Z3A.00 WEEKS OF GESTATION OF PREGNANCY NOT SPECIFIED: ICD-10-CM

## 2019-12-11 DIAGNOSIS — O42.90 PREMATURE RUPTURE OF MEMBRANES, UNSPECIFIED AS TO LENGTH OF TIME BETWEEN RUPTURE AND ONSET OF LABOR, UNSPECIFIED WEEKS OF GESTATION: ICD-10-CM

## 2019-12-11 DIAGNOSIS — O26.899 OTHER SPECIFIED PREGNANCY RELATED CONDITIONS, UNSPECIFIED TRIMESTER: ICD-10-CM

## 2019-12-11 LAB
BASOPHILS # BLD AUTO: 0.03 K/UL — SIGNIFICANT CHANGE UP (ref 0–0.2)
BASOPHILS NFR BLD AUTO: 0.2 % — SIGNIFICANT CHANGE UP (ref 0–2)
EOSINOPHIL # BLD AUTO: 0.14 K/UL — SIGNIFICANT CHANGE UP (ref 0–0.5)
EOSINOPHIL NFR BLD AUTO: 1.1 % — SIGNIFICANT CHANGE UP (ref 0–6)
HBV SURFACE AG SER-ACNC: NEGATIVE — SIGNIFICANT CHANGE UP
HCT VFR BLD CALC: 38.8 % — SIGNIFICANT CHANGE UP (ref 34.5–45)
HGB BLD-MCNC: 12.4 G/DL — SIGNIFICANT CHANGE UP (ref 11.5–15.5)
HIV COMBO RESULT: SIGNIFICANT CHANGE UP
HIV1+2 AB SPEC QL: SIGNIFICANT CHANGE UP
IMM GRANULOCYTES NFR BLD AUTO: 0.5 % — SIGNIFICANT CHANGE UP (ref 0–1.5)
LYMPHOCYTES # BLD AUTO: 24.3 % — SIGNIFICANT CHANGE UP (ref 13–44)
LYMPHOCYTES # BLD AUTO: 3 K/UL — SIGNIFICANT CHANGE UP (ref 1–3.3)
MCHC RBC-ENTMCNC: 27.6 PG — SIGNIFICANT CHANGE UP (ref 27–34)
MCHC RBC-ENTMCNC: 32 % — SIGNIFICANT CHANGE UP (ref 32–36)
MCV RBC AUTO: 86.2 FL — SIGNIFICANT CHANGE UP (ref 80–100)
MONOCYTES # BLD AUTO: 0.77 K/UL — SIGNIFICANT CHANGE UP (ref 0–0.9)
MONOCYTES NFR BLD AUTO: 6.2 % — SIGNIFICANT CHANGE UP (ref 2–14)
NEUTROPHILS # BLD AUTO: 8.34 K/UL — HIGH (ref 1.8–7.4)
NEUTROPHILS NFR BLD AUTO: 67.7 % — SIGNIFICANT CHANGE UP (ref 43–77)
NRBC # FLD: 0 K/UL — SIGNIFICANT CHANGE UP (ref 0–0)
PLATELET # BLD AUTO: 273 K/UL — SIGNIFICANT CHANGE UP (ref 150–400)
PMV BLD: 9.7 FL — SIGNIFICANT CHANGE UP (ref 7–13)
RBC # BLD: 4.5 M/UL — SIGNIFICANT CHANGE UP (ref 3.8–5.2)
RBC # FLD: 15.2 % — HIGH (ref 10.3–14.5)
RH IG SCN BLD-IMP: POSITIVE — SIGNIFICANT CHANGE UP
T PALLIDUM AB TITR SER: NEGATIVE — SIGNIFICANT CHANGE UP
T PALLIDUM AB TITR SER: NEGATIVE — SIGNIFICANT CHANGE UP
WBC # BLD: 12.34 K/UL — HIGH (ref 3.8–10.5)
WBC # FLD AUTO: 12.34 K/UL — HIGH (ref 3.8–10.5)

## 2019-12-11 RX ORDER — VANCOMYCIN HCL 1 G
1000 VIAL (EA) INTRAVENOUS ONCE
Refills: 0 | Status: COMPLETED | OUTPATIENT
Start: 2019-12-11 | End: 2019-12-11

## 2019-12-11 RX ORDER — SODIUM CHLORIDE 9 MG/ML
1000 INJECTION, SOLUTION INTRAVENOUS
Refills: 0 | Status: DISCONTINUED | OUTPATIENT
Start: 2019-12-11 | End: 2019-12-12

## 2019-12-11 RX ORDER — VANCOMYCIN HCL 1 G
VIAL (EA) INTRAVENOUS
Refills: 0 | Status: DISCONTINUED | OUTPATIENT
Start: 2019-12-11 | End: 2019-12-11

## 2019-12-11 RX ORDER — OXYTOCIN 10 UNIT/ML
333.33 VIAL (ML) INJECTION
Qty: 20 | Refills: 0 | Status: DISCONTINUED | OUTPATIENT
Start: 2019-12-11 | End: 2019-12-11

## 2019-12-11 RX ORDER — VANCOMYCIN HCL 1 G
1000 VIAL (EA) INTRAVENOUS EVERY 12 HOURS
Refills: 0 | Status: DISCONTINUED | OUTPATIENT
Start: 2019-12-11 | End: 2019-12-12

## 2019-12-11 RX ORDER — CITRIC ACID/SODIUM CITRATE 300-500 MG
15 SOLUTION, ORAL ORAL EVERY 6 HOURS
Refills: 0 | Status: DISCONTINUED | OUTPATIENT
Start: 2019-12-11 | End: 2019-12-11

## 2019-12-11 RX ORDER — OXYTOCIN 10 UNIT/ML
333.33 VIAL (ML) INJECTION
Qty: 20 | Refills: 0 | Status: DISCONTINUED | OUTPATIENT
Start: 2019-12-11 | End: 2019-12-13

## 2019-12-11 RX ORDER — VANCOMYCIN HCL 1 G
1000 VIAL (EA) INTRAVENOUS EVERY 12 HOURS
Refills: 0 | Status: DISCONTINUED | OUTPATIENT
Start: 2019-12-11 | End: 2019-12-11

## 2019-12-11 RX ORDER — CITRIC ACID/SODIUM CITRATE 300-500 MG
15 SOLUTION, ORAL ORAL EVERY 6 HOURS
Refills: 0 | Status: DISCONTINUED | OUTPATIENT
Start: 2019-12-11 | End: 2019-12-12

## 2019-12-11 RX ORDER — OXYTOCIN 10 UNIT/ML
2 VIAL (ML) INJECTION
Qty: 30 | Refills: 0 | Status: DISCONTINUED | OUTPATIENT
Start: 2019-12-11 | End: 2019-12-11

## 2019-12-11 RX ORDER — SODIUM CHLORIDE 9 MG/ML
1000 INJECTION, SOLUTION INTRAVENOUS
Refills: 0 | Status: DISCONTINUED | OUTPATIENT
Start: 2019-12-11 | End: 2019-12-11

## 2019-12-11 RX ORDER — VANCOMYCIN HCL 1 G
1000 VIAL (EA) INTRAVENOUS ONCE
Refills: 0 | Status: DISCONTINUED | OUTPATIENT
Start: 2019-12-11 | End: 2019-12-11

## 2019-12-11 RX ADMIN — Medication 250 MILLIGRAM(S): at 03:16

## 2019-12-11 RX ADMIN — Medication 2 MILLIUNIT(S)/MIN: at 11:47

## 2019-12-11 RX ADMIN — Medication 250 MILLIGRAM(S): at 15:42

## 2019-12-11 RX ADMIN — Medication 250 MILLIGRAM(S): at 15:20

## 2019-12-11 RX ADMIN — SODIUM CHLORIDE 125 MILLILITER(S): 9 INJECTION, SOLUTION INTRAVENOUS at 03:18

## 2019-12-11 NOTE — OB RN PATIENT PROFILE - PMH
<<----- Click to add NO pertinent Past Medical History No pertinent past medical history Cellulitis  Right Foot - July 2019  Lymphangitis  July 2019  PCOS (polycystic ovarian syndrome)  Metformin 500mg/BID prior to pregnancy

## 2019-12-11 NOTE — CHART NOTE - NSCHARTNOTEFT_GEN_A_CORE
Patient comfortable post epi.  On pit for 3-4 hours  VSS  EFW 8.5  Ext - c/c/e nt  SVE 2-3/70/-2 intact  pelvis adequate by exam    EFM +moderate variability +accels -decels ctx q 2-3    IMP  IUP  IOL for oligo  PLAN  d/c pit and restart cytotec  SHIRLEY Hatfield

## 2019-12-11 NOTE — OB PROVIDER H&P - NS_FORCEPSDELIVERY_OBGYN_ALL_OB_NU
04/26/19 1334 Wound Leg Lower Right;Lateral  
Date First Assessed/Time First Assessed: 11/14/18 0830   POA: Yes  Wound Type: Pressure injury  Location: Leg Lower  Orientation: Right;Lateral  
Dressing Status  Removed Dressing Type  Unna boot 
(Acticoat flex7) Wound Length (cm) 2.4 cm Wound Width (cm) 0.8 cm Wound Depth (cm) 0.1 Wound Surface area (cm^2) 1.92 cm^2 Change in Wound Size % 96.67 Condition of Base Granulation;Slough Condition of Edges Open Drainage Amount  Small Drainage Color Tan;Serosanguinous Wound Odor None Periwound Skin Condition Intact Cleansing and Cleansing Agents  Soap and water;Normal saline Visit Vitals /73 Pulse 91 Temp 96.7 °F (35.9 °C) Resp 16 RLE Peripheral Vascular Capillary Refill: Less than/equal to 3 seconds (04/26/19 0827) Color: Appropriate for race (04/26/19 0827) Temperature: Warm (04/26/19 0827) Sensation: Present (04/26/19 0827) Pedal Pulse: Palpable (04/26/19 0827) Circumference of Calf (cm): 40.5 cm (04/26/19 0827) Location of Measurement (Calf): Mid  (04/26/19 0827) Circumference of Ankle (cm): 22 cm (04/26/19 0827) Location of Measurement (Ankle): Upper  (04/26/19 0827) 0

## 2019-12-11 NOTE — OB PROVIDER TRIAGE NOTE - NSHPPHYSICALEXAM_GEN_ALL_CORE
T(C): 36.6 (12-11-19 @ 00:36), Max: 36.6 (12-11-19 @ 00:29)  HR: 97 (12-11-19 @ 01:05) (88 - 97)  BP: 122/80 (12-11-19 @ 01:05) (122/80 - 134/84)  RR: 18 (12-11-19 @ 00:29) (18 - 18)    Heart: RRR  Lungs: CTA  Abdomen: Gravid, soft, NT    NST: Reactive with moderate variability, Category 1 tracing  [place]: Irregular contractions  VE: Long/closed/post/high, intact membranes

## 2019-12-11 NOTE — OB PROVIDER H&P - NSHPPHYSICALEXAM_GEN_ALL_CORE
T(C): 36.6 (12-11-19 @ 00:36), Max: 36.6 (12-11-19 @ 00:29)  HR: 97 (12-11-19 @ 01:05) (88 - 97)  BP: 122/80 (12-11-19 @ 01:05) (122/80 - 134/84)  RR: 18 (12-11-19 @ 00:29) (18 - 18)    Heart: RRR  Lungs: CTA  Abdomen: Gravid, soft, NT    NST: Reactive with moderate variability, Category 1 tracing  [place]: Irregular contractions  VE: Long/closed/post/high, intact membranes

## 2019-12-11 NOTE — OB PROVIDER H&P - ASSESSMENT
29y  at 40w3d presents to triage c/o irregular contractions.  Reports +FM, no vaginal bleeding, no ROM or LOF  AP complications: Denies  GBS: Positive    GYN: Denies any h/o STDs, fibroids, ovarian Cyst, or abnormal pap smear  OBH:   PAST MEDICAL: Denies  PAST SURGICAL HISTORY: Denies    Allergies:  penicillin (Anaphylaxis)  Robitussin (Anaphylaxis)    MEDICATIONS  (STANDING):  citric acid/sodium citrate Solution 15 milliLiter(s) Oral every 6 hours  lactated ringers. 1000 milliLiter(s) (125 mL/Hr) IV Continuous <Continuous>  oxytocin Infusion 333.333 milliUNIT(s)/Min (1000 mL/Hr) IV Continuous <Continuous>  vancomycin  IVPB 1000 milliGRAM(s) IV Intermittent once  vancomycin  IVPB 1000 milliGRAM(s) IV Intermittent every 12 hours  vancomycin  IVPB        MEDICATIONS:  PNV    T(C): 36.6 (19 @ 00:36), Max: 36.6 (19 @ 00:29)  HR: 97 (19 @ 01:05) (88 - 97)  BP: 122/80 (19 @ 01:05) (122/80 - 134/84)  RR: 18 (19 @ 00:29) (18 - 18)    Heart: RRR  Lungs: CTA  Abdomen: Gravid, soft, NT    NST: Reactive with moderate variability, Category 1 tracing  [place]: Irregular contractions  VE: Long/closed/post/high, intact membranes    A/P: 30y/o  female  at 40w3d presents to triage c/o irregular contractions.  Upon evaluation, pt was found to have oligohydramnios with an MICHELE of 2.  GBS: Positive  D/w Dr Spears  -Admit to labor and delivery for induction of labor  -Cont EFM/[place]  -Admission labs: CBC, RPR, T&S  -IV hydration  -Clear liquid diet  -Vancomycin  -Cytotec or for IOL

## 2019-12-11 NOTE — OB RN PATIENT PROFILE - TEACHING/LEARNING FACTORS INFLUENCE READINESS TO LEARN
A-T Advancement Flap Text: The defect edges were debeveled with a #15 scalpel blade.  Given the location of the defect, shape of the defect and the proximity to free margins an A-T advancement flap was deemed most appropriate.  Using a sterile surgical marker, an appropriate advancement flap was drawn incorporating the defect and placing the expected incisions within the relaxed skin tension lines where possible.    The area thus outlined was incised deep to adipose tissue with a #15 scalpel blade.  The skin margins were undermined to an appropriate distance in all directions utilizing iris scissors. none

## 2019-12-11 NOTE — OB PROVIDER H&P - NSHPREVIEWOFSYSTEMS_GEN_ALL_CORE

## 2019-12-11 NOTE — OB RN TRIAGE NOTE - NS_TRIAGEPROVIDERNOTIFIEDBY_OBGYN_ALL_OB_FT
Elsa said the pharmacy never got the corrected Divalproex script.  She needs a new script sent to the pharmacy and a copy of the corrected script from last month.  Nely is in adult foster care and the state regulates her care.   bgrant

## 2019-12-11 NOTE — OB PROVIDER H&P - HISTORY OF PRESENT ILLNESS
29y  female  at 40w3d presents to triage c/o irregular contractions.  Reports +FM, no vaginal bleeding, no ROM or LOF  AP complications: Denies  GBS: Positive

## 2019-12-11 NOTE — OB PROVIDER IHI INDUCTION/AUGMENTATION NOTE - NS_CHECKALL_OBGYN_ALL_OB
H&P was completed/FHR was reviewed/Contractions pattern was reviewed/Order was written/Induction / Augmentation was discussed

## 2019-12-11 NOTE — OB PROVIDER TRIAGE NOTE - HISTORY OF PRESENT ILLNESS
29y  at 40w3d presents to triage c/o irregular contractions.  Reports +FM, no vaginal bleeding, no ROM or LOF  AP complications: Denies  GBS: Positive

## 2019-12-11 NOTE — OB PROVIDER TRIAGE NOTE - NSOBPROVIDERNOTE_OBGYN_ALL_OB_FT
Pt at 40w3d found to have oligohydramnios  D/w Dr Spears  Admit to L&D  Routine labs IV hydration  PO Cytotec

## 2019-12-11 NOTE — CHART NOTE - NSCHARTNOTEFT_GEN_A_CORE
Patient assessed at bedside for cervical change.  Due for next dose of cytotec.    VS  T(C): 36.6 (12-11-19 @ 19:58)  HR: 90 (12-11-19 @ 22:36)  BP: 111/73 (12-11-19 @ 22:36)  RR: 16 (12-11-19 @ 02:28)  SpO2: 97% (12-11-19 @ 22:33)    SVE: 3.5/70/-2  EFM: 145, mod, +accels, -decels  [place]: q3-4min    Plan:  -c/w cytotec induction  -reassess before next dose    d/w Dr. Alysia Parker, PGY1

## 2019-12-11 NOTE — OB PROVIDER H&P - PROBLEM SELECTOR PLAN 1
-Admit to labor and delivery for induction of labor  -Cont EFM/[place]  -Admission labs: CBC, RPR, T&S  -IV hydration  -Clear liquid diet  -Vancomycin  -Cytotec or for IOL

## 2019-12-12 LAB
RUBV IGG SER-ACNC: 1.5 INDEX — SIGNIFICANT CHANGE UP
RUBV IGG SER-IMP: POSITIVE — SIGNIFICANT CHANGE UP

## 2019-12-12 PROCEDURE — 59514 CESAREAN DELIVERY ONLY: CPT

## 2019-12-12 RX ORDER — KETOROLAC TROMETHAMINE 30 MG/ML
30 SYRINGE (ML) INJECTION EVERY 6 HOURS
Refills: 0 | Status: DISCONTINUED | OUTPATIENT
Start: 2019-12-12 | End: 2019-12-13

## 2019-12-12 RX ORDER — OXYTOCIN 10 UNIT/ML
333.33 VIAL (ML) INJECTION
Qty: 20 | Refills: 0 | Status: DISCONTINUED | OUTPATIENT
Start: 2019-12-12 | End: 2019-12-13

## 2019-12-12 RX ORDER — FAMOTIDINE 10 MG/ML
20 INJECTION INTRAVENOUS ONCE
Refills: 0 | Status: DISCONTINUED | OUTPATIENT
Start: 2019-12-12 | End: 2019-12-12

## 2019-12-12 RX ORDER — GLYCERIN ADULT
1 SUPPOSITORY, RECTAL RECTAL AT BEDTIME
Refills: 0 | Status: DISCONTINUED | OUTPATIENT
Start: 2019-12-12 | End: 2019-12-15

## 2019-12-12 RX ORDER — CITRIC ACID/SODIUM CITRATE 300-500 MG
15 SOLUTION, ORAL ORAL ONCE
Refills: 0 | Status: DISCONTINUED | OUTPATIENT
Start: 2019-12-12 | End: 2019-12-12

## 2019-12-12 RX ORDER — METOCLOPRAMIDE HCL 10 MG
10 TABLET ORAL ONCE
Refills: 0 | Status: DISCONTINUED | OUTPATIENT
Start: 2019-12-12 | End: 2019-12-12

## 2019-12-12 RX ORDER — OXYCODONE HYDROCHLORIDE 5 MG/1
5 TABLET ORAL
Refills: 0 | Status: COMPLETED | OUTPATIENT
Start: 2019-12-12 | End: 2019-12-19

## 2019-12-12 RX ORDER — ONDANSETRON 8 MG/1
4 TABLET, FILM COATED ORAL ONCE
Refills: 0 | Status: COMPLETED | OUTPATIENT
Start: 2019-12-12 | End: 2019-12-12

## 2019-12-12 RX ORDER — SODIUM CHLORIDE 9 MG/ML
1000 INJECTION, SOLUTION INTRAVENOUS
Refills: 0 | Status: DISCONTINUED | OUTPATIENT
Start: 2019-12-12 | End: 2019-12-13

## 2019-12-12 RX ORDER — ONDANSETRON 8 MG/1
4 TABLET, FILM COATED ORAL EVERY 6 HOURS
Refills: 0 | Status: DISCONTINUED | OUTPATIENT
Start: 2019-12-13 | End: 2019-12-15

## 2019-12-12 RX ORDER — OXYTOCIN 10 UNIT/ML
2 VIAL (ML) INJECTION
Qty: 30 | Refills: 0 | Status: DISCONTINUED | OUTPATIENT
Start: 2019-12-12 | End: 2019-12-12

## 2019-12-12 RX ORDER — OXYCODONE HYDROCHLORIDE 5 MG/1
5 TABLET ORAL
Refills: 0 | Status: DISCONTINUED | OUTPATIENT
Start: 2019-12-13 | End: 2019-12-13

## 2019-12-12 RX ORDER — SIMETHICONE 80 MG/1
80 TABLET, CHEWABLE ORAL EVERY 4 HOURS
Refills: 0 | Status: DISCONTINUED | OUTPATIENT
Start: 2019-12-12 | End: 2019-12-15

## 2019-12-12 RX ORDER — ACETAMINOPHEN 500 MG
975 TABLET ORAL
Refills: 0 | Status: DISCONTINUED | OUTPATIENT
Start: 2019-12-12 | End: 2019-12-15

## 2019-12-12 RX ORDER — BUTORPHANOL TARTRATE 2 MG/ML
0.12 INJECTION, SOLUTION INTRAMUSCULAR; INTRAVENOUS EVERY 6 HOURS
Refills: 0 | Status: DISCONTINUED | OUTPATIENT
Start: 2019-12-13 | End: 2019-12-15

## 2019-12-12 RX ORDER — DIPHENOXYLATE HCL/ATROPINE 2.5-.025MG
2 TABLET ORAL ONCE
Refills: 0 | Status: DISCONTINUED | OUTPATIENT
Start: 2019-12-12 | End: 2019-12-15

## 2019-12-12 RX ORDER — IBUPROFEN 200 MG
600 TABLET ORAL EVERY 6 HOURS
Refills: 0 | Status: COMPLETED | OUTPATIENT
Start: 2019-12-12 | End: 2020-11-09

## 2019-12-12 RX ORDER — HYDROMORPHONE HYDROCHLORIDE 2 MG/ML
1 INJECTION INTRAMUSCULAR; INTRAVENOUS; SUBCUTANEOUS
Refills: 0 | Status: DISCONTINUED | OUTPATIENT
Start: 2019-12-13 | End: 2019-12-13

## 2019-12-12 RX ORDER — LANOLIN
1 OINTMENT (GRAM) TOPICAL EVERY 6 HOURS
Refills: 0 | Status: DISCONTINUED | OUTPATIENT
Start: 2019-12-12 | End: 2019-12-15

## 2019-12-12 RX ORDER — HEPARIN SODIUM 5000 [USP'U]/ML
5000 INJECTION INTRAVENOUS; SUBCUTANEOUS EVERY 12 HOURS
Refills: 0 | Status: DISCONTINUED | OUTPATIENT
Start: 2019-12-12 | End: 2019-12-15

## 2019-12-12 RX ORDER — TETANUS TOXOID, REDUCED DIPHTHERIA TOXOID AND ACELLULAR PERTUSSIS VACCINE, ADSORBED 5; 2.5; 8; 8; 2.5 [IU]/.5ML; [IU]/.5ML; UG/.5ML; UG/.5ML; UG/.5ML
0.5 SUSPENSION INTRAMUSCULAR ONCE
Refills: 0 | Status: DISCONTINUED | OUTPATIENT
Start: 2019-12-12 | End: 2019-12-15

## 2019-12-12 RX ORDER — OXYCODONE HYDROCHLORIDE 5 MG/1
5 TABLET ORAL ONCE
Refills: 0 | Status: DISCONTINUED | OUTPATIENT
Start: 2019-12-12 | End: 2019-12-15

## 2019-12-12 RX ORDER — NALOXONE HYDROCHLORIDE 4 MG/.1ML
0.1 SPRAY NASAL
Refills: 0 | Status: DISCONTINUED | OUTPATIENT
Start: 2019-12-13 | End: 2019-12-15

## 2019-12-12 RX ORDER — OXYCODONE HYDROCHLORIDE 5 MG/1
10 TABLET ORAL
Refills: 0 | Status: DISCONTINUED | OUTPATIENT
Start: 2019-12-13 | End: 2019-12-13

## 2019-12-12 RX ORDER — MAGNESIUM HYDROXIDE 400 MG/1
30 TABLET, CHEWABLE ORAL
Refills: 0 | Status: DISCONTINUED | OUTPATIENT
Start: 2019-12-12 | End: 2019-12-15

## 2019-12-12 RX ORDER — DIPHENHYDRAMINE HCL 50 MG
25 CAPSULE ORAL EVERY 6 HOURS
Refills: 0 | Status: DISCONTINUED | OUTPATIENT
Start: 2019-12-12 | End: 2019-12-15

## 2019-12-12 RX ADMIN — SODIUM CHLORIDE 125 MILLILITER(S): 9 INJECTION, SOLUTION INTRAVENOUS at 16:19

## 2019-12-12 RX ADMIN — Medication 2 MILLIUNIT(S)/MIN: at 16:19

## 2019-12-12 RX ADMIN — Medication 2 MILLIUNIT(S)/MIN: at 05:46

## 2019-12-12 RX ADMIN — Medication 975 MILLIGRAM(S): at 23:02

## 2019-12-12 RX ADMIN — SODIUM CHLORIDE 125 MILLILITER(S): 9 INJECTION, SOLUTION INTRAVENOUS at 20:18

## 2019-12-12 RX ADMIN — Medication 1000 MILLIUNIT(S)/MIN: at 19:45

## 2019-12-12 RX ADMIN — Medication 250 MILLIGRAM(S): at 04:03

## 2019-12-12 RX ADMIN — Medication 975 MILLIGRAM(S): at 22:44

## 2019-12-12 RX ADMIN — Medication 250 MILLIGRAM(S): at 16:00

## 2019-12-12 RX ADMIN — Medication 0.2 MILLIGRAM(S): at 22:45

## 2019-12-12 NOTE — PROGRESS NOTE ADULT - SUBJECTIVE AND OBJECTIVE BOX
Attending Note      Vital Signs Last 24 Hrs  T(C): 36.9 (12 Dec 2019 10:12), Max: 37.0 (11 Dec 2019 14:00)  T(F): 98.42 (12 Dec 2019 10:12), Max: 98.6 (11 Dec 2019 14:00)  HR: 142 (12 Dec 2019 10:58) (68 - 142)  BP: 112/53 (12 Dec 2019 10:53) (83/47 - 130/72)  BP(mean): --  RR: 18 (11 Dec 2019 20:00) (18 - 18)  SpO2: 96% (12 Dec 2019 10:53) (77% - 100%)    FETAL HEART RATE:  category 1 baseline 155 - 160   [place]: adequate contraction pattern  adequate  [place] units  CERVICAL EXAM:  8-9 cm / vtx -2 station     PAIN SCALE (0-10):  10/10 no relief     Assessment/ plan   I d/w patient and  all findings and she agrees with re doing epidural as she cannot tolerate pain     C Stephan

## 2019-12-12 NOTE — OB RN DELIVERY SUMMARY - NS_LABORCHARACTER_OBGYN_ALL_OB
Internal electronic FM/External electronic FM/Augmentation of labor/Induction of labor-Medicinal/Antibiotics in labor

## 2019-12-12 NOTE — PROGRESS NOTE ADULT - SUBJECTIVE AND OBJECTIVE BOX
Attending Note    c/o pain   Vital Signs Last 24 Hrs  T(C): 36.9 (12 Dec 2019 10:12), Max: 37 (11 Dec 2019 20:00)  T(F): 98.42 (12 Dec 2019 10:12), Max: 98.6 (11 Dec 2019 20:00)  HR: 128 (12 Dec 2019 15:08) (68 - 142)  BP: 110/81 (12 Dec 2019 15:01) (83/47 - 130/72)  BP(mean): --  RR: 18 (11 Dec 2019 20:00) (18 - 18)  SpO2: 99% (12 Dec 2019 15:03) (77% - 100%)    FETAL HEART RATE:160 mod variability variable decel x1 noted   scalp electrode placed   CERVICAL EXAM:FD /100 /-1      Assessment/ plan allow for descend and peanut ball and top off   d/w patient if no descend for  delivery or if tracing not reassuring   C Stephan

## 2019-12-12 NOTE — OB NEONATOLOGY/PEDIATRICIAN DELIVERY SUMMARY - BABY A: STOOL IN DELIVERY
Hpi Title: Evaluation of Skin Lesions no How Severe Are Your Spot(S)?: mild Have Your Spot(S) Been Treated In The Past?: has not been treated Family Member: Father

## 2019-12-12 NOTE — OB NEONATOLOGY/PEDIATRICIAN DELIVERY SUMMARY - NSPEDSNEONOTESA_OBGYN_ALL_OB_FT
40.4 wk female born to a 30 y/o  mother via primary CS for failure to descent and NRFHT. Maternal history significant for PCOS on metformin previously. Pregnancy complicated by oligohydramnios, induced for oligo but failure to descend. Maternal blood type O+. Prenatal labs negative, non-reactive and immune. GBS positive on . SROM at 5:25 am, clear fluids EOS 0.29 . Baby was born vigorous   and crying spontaneously. W/D/S/S. APGARS 9/9.  Mom is planning on breastfeeding,   wants hep B vaccination.

## 2019-12-12 NOTE — CHART NOTE - NSCHARTNOTEFT_GEN_A_CORE
Patient assessed at bedside for cervical change and to provide some scalp stimulation to baby.    VS  T(C): 37.0 (12-11-19 @ 22:59)  HR: 86 (12-12-19 @ 04:48)  BP: 116/65 (12-12-19 @ 04:36)  RR: 18 (12-11-19 @ 20:00)  SpO2: 97% (12-12-19 @ 04:43)    SVE: 5.5/80/-3  EFM: 155, mod, +accels, -decels  [place]: irregular    Plan:

## 2019-12-12 NOTE — OB RN DELIVERY SUMMARY - NS_SEPSISRSKCALC_OBGYN_ALL_OB_FT
EOS calculated successfully. EOS Risk Factor: 0.5/1000 live births ([facility] national incidence); GA=40w4d; Temp=98.6; ROM=12.5; GBS='Positive'; Antibiotics='Broad spectrum antibiotics > 4 hrs prior to birth'

## 2019-12-12 NOTE — OB PROVIDER DELIVERY SUMMARY - NSPROVIDERDELIVERYNOTE_OBGYN_ALL_OB_FT
liveborn infant delivered from LOP position, asynclitic  extension inferior to hysterotomy  bladder backfilled w 200cc methylene blue, no extravasation noted  fibrillar at hysterotomy site  atonic uterus, IM pitocin, hemabate, methergine given w good response  grossly nl uterus, tubes and ovaries b/l    IVF 2000   liveborn infant delivered from LOP position, asynclitic  extension inferior to hysterotomy in the right side   bladder backfilled w 300cc methylene blue, no extravasation noted  fibrillar at hysterotomy site  atonic uterus, IM pitocin, hemabate, methergine given w good response  grossly nl uterus, tubes and ovaries b/l    IVF 2000

## 2019-12-12 NOTE — CHART NOTE - NSCHARTNOTEFT_GEN_A_CORE
NP note    Pt seen for increased pain    Vital Signs Last 24 Hrs  T(C): 36.8 (12 Dec 2019 08:03), Max: 37.0 (11 Dec 2019 14:00)  T(F): 98.24 (12 Dec 2019 08:03), Max: 98.6 (11 Dec 2019 14:00)  HR: 104 (12 Dec 2019 10:03) (68 - 135)  BP: 114/57 (12 Dec 2019 09:51) (83/47 - 150/96)  BP(mean): --  RR: 18 (11 Dec 2019 20:00) (18 - 18)  SpO2: 94% (12 Dec 2019 09:59) (84% - 100%)    /mod kacie/+ accels/occ variable  ctx 2-3min IUPC adjusted   SVE 6-7/80/-2 swollen    -For top off  -Cont pitocin  -Peanut ball  -Will be re-evaluated in 2 hours by Dr. Stephan fowler. NP

## 2019-12-12 NOTE — CHART NOTE - NSCHARTNOTEFT_GEN_A_CORE
Patient evaluated bedside for increased pain and pressure with contractions. IUPC displaced, replaced.    T(C): 36.8 (02:57)  HR: 135 (08:03)  BP: 120/58 (07:50)  RR: 18 (20:00)  SpO2: 96% (07:58)    SVE: 5/90/-2  EFM: CAT I  BPM, moderate variability, -accels, -decels  [place]:  CTX q2m    A/P:  Patient is a 30yo  at 40-4 here for oligohydramnios IOL. Continue with pitocin IOL as previously discussed. For epidural top off.    Dari Vasquez, PGY2

## 2019-12-12 NOTE — CHART NOTE - NSCHARTNOTEFT_GEN_A_CORE
Patient assessed at bedside for cervical change.    VS  T(C): 37.0 (12-11-19 @ 22:59)  HR: 84 (12-12-19 @ 05:03)  BP: 110/58 (12-12-19 @ 05:07)  RR: 18 (12-11-19 @ 20:00)  SpO2: 99% (12-12-19 @ 05:03)    SVE: 5.5/80/-3  EFM: 145, mod, +accels, -decels  [place]: q5min    Plan:  -4Pit    d/w Dr. Alysia Parker, PGY1 Patient assessed at bedside for cervical change.    VS  T(C): 37.0 (12-11-19 @ 22:59)  HR: 84 (12-12-19 @ 05:03)  BP: 110/58 (12-12-19 @ 05:07)  RR: 18 (12-11-19 @ 20:00)  SpO2: 99% (12-12-19 @ 05:03)    SVE: 5.5/80/-3  EFM: 145, mod, +accels, -decels  [place]: q5min    Plan:  -4Pit  -IUPC placed    d/w Dr. Alysia Parker, PGY1

## 2019-12-13 ENCOUNTER — TRANSCRIPTION ENCOUNTER (OUTPATIENT)
Age: 29
End: 2019-12-13

## 2019-12-13 LAB
ANISOCYTOSIS BLD QL: SLIGHT — SIGNIFICANT CHANGE UP
BASOPHILS # BLD AUTO: 0.04 K/UL — SIGNIFICANT CHANGE UP (ref 0–0.2)
BASOPHILS NFR BLD AUTO: 0.2 % — SIGNIFICANT CHANGE UP (ref 0–2)
BASOPHILS NFR SPEC: 0 % — SIGNIFICANT CHANGE UP (ref 0–2)
BLASTS # FLD: 0 % — SIGNIFICANT CHANGE UP (ref 0–0)
EOSINOPHIL # BLD AUTO: 0.01 K/UL — SIGNIFICANT CHANGE UP (ref 0–0.5)
EOSINOPHIL NFR BLD AUTO: 0 % — SIGNIFICANT CHANGE UP (ref 0–6)
EOSINOPHIL NFR FLD: 0 % — SIGNIFICANT CHANGE UP (ref 0–6)
GIANT PLATELETS BLD QL SMEAR: PRESENT — SIGNIFICANT CHANGE UP
HCT VFR BLD CALC: 31.1 % — LOW (ref 34.5–45)
HGB BLD-MCNC: 10.2 G/DL — LOW (ref 11.5–15.5)
IMM GRANULOCYTES NFR BLD AUTO: 0.6 % — SIGNIFICANT CHANGE UP (ref 0–1.5)
LYMPHOCYTES # BLD AUTO: 1.87 K/UL — SIGNIFICANT CHANGE UP (ref 1–3.3)
LYMPHOCYTES # BLD AUTO: 8.2 % — LOW (ref 13–44)
LYMPHOCYTES NFR SPEC AUTO: 8.9 % — LOW (ref 13–44)
MCHC RBC-ENTMCNC: 28.1 PG — SIGNIFICANT CHANGE UP (ref 27–34)
MCHC RBC-ENTMCNC: 32.8 % — SIGNIFICANT CHANGE UP (ref 32–36)
MCV RBC AUTO: 85.7 FL — SIGNIFICANT CHANGE UP (ref 80–100)
METAMYELOCYTES # FLD: 0 % — SIGNIFICANT CHANGE UP (ref 0–1)
MONOCYTES # BLD AUTO: 1.57 K/UL — HIGH (ref 0–0.9)
MONOCYTES NFR BLD AUTO: 6.9 % — SIGNIFICANT CHANGE UP (ref 2–14)
MONOCYTES NFR BLD: 4.4 % — SIGNIFICANT CHANGE UP (ref 2–9)
MYELOCYTES NFR BLD: 0 % — SIGNIFICANT CHANGE UP (ref 0–0)
NEUTROPHIL AB SER-ACNC: 82.3 % — HIGH (ref 43–77)
NEUTROPHILS # BLD AUTO: 19.14 K/UL — HIGH (ref 1.8–7.4)
NEUTROPHILS NFR BLD AUTO: 84.1 % — HIGH (ref 43–77)
NEUTS BAND # BLD: 4.4 % — SIGNIFICANT CHANGE UP (ref 0–6)
NRBC # FLD: 0 K/UL — SIGNIFICANT CHANGE UP (ref 0–0)
OTHER - HEMATOLOGY %: 0 — SIGNIFICANT CHANGE UP
PLATELET # BLD AUTO: 241 K/UL — SIGNIFICANT CHANGE UP (ref 150–400)
PLATELET COUNT - ESTIMATE: NORMAL — SIGNIFICANT CHANGE UP
PMV BLD: 9.4 FL — SIGNIFICANT CHANGE UP (ref 7–13)
PROMYELOCYTES # FLD: 0 % — SIGNIFICANT CHANGE UP (ref 0–0)
RBC # BLD: 3.63 M/UL — LOW (ref 3.8–5.2)
RBC # FLD: 15.8 % — HIGH (ref 10.3–14.5)
VARIANT LYMPHS # BLD: 0 % — SIGNIFICANT CHANGE UP
WBC # BLD: 22.76 K/UL — HIGH (ref 3.8–10.5)
WBC # FLD AUTO: 22.76 K/UL — HIGH (ref 3.8–10.5)

## 2019-12-13 RX ORDER — IBUPROFEN 200 MG
600 TABLET ORAL EVERY 6 HOURS
Refills: 0 | Status: DISCONTINUED | OUTPATIENT
Start: 2019-12-13 | End: 2019-12-15

## 2019-12-13 RX ORDER — IBUPROFEN 200 MG
1 TABLET ORAL
Qty: 0 | Refills: 0 | DISCHARGE
Start: 2019-12-13

## 2019-12-13 RX ORDER — ACETAMINOPHEN 500 MG
3 TABLET ORAL
Qty: 0 | Refills: 0 | DISCHARGE
Start: 2019-12-13

## 2019-12-13 RX ORDER — FERROUS SULFATE 325(65) MG
325 TABLET ORAL DAILY
Refills: 0 | Status: DISCONTINUED | OUTPATIENT
Start: 2019-12-13 | End: 2019-12-15

## 2019-12-13 RX ADMIN — HEPARIN SODIUM 5000 UNIT(S): 5000 INJECTION INTRAVENOUS; SUBCUTANEOUS at 02:22

## 2019-12-13 RX ADMIN — SIMETHICONE 80 MILLIGRAM(S): 80 TABLET, CHEWABLE ORAL at 05:25

## 2019-12-13 RX ADMIN — Medication 975 MILLIGRAM(S): at 16:00

## 2019-12-13 RX ADMIN — OXYCODONE HYDROCHLORIDE 5 MILLIGRAM(S): 5 TABLET ORAL at 12:10

## 2019-12-13 RX ADMIN — Medication 325 MILLIGRAM(S): at 11:42

## 2019-12-13 RX ADMIN — Medication 975 MILLIGRAM(S): at 06:10

## 2019-12-13 RX ADMIN — Medication 30 MILLIGRAM(S): at 12:15

## 2019-12-13 RX ADMIN — HEPARIN SODIUM 5000 UNIT(S): 5000 INJECTION INTRAVENOUS; SUBCUTANEOUS at 13:57

## 2019-12-13 RX ADMIN — OXYCODONE HYDROCHLORIDE 5 MILLIGRAM(S): 5 TABLET ORAL at 11:11

## 2019-12-13 RX ADMIN — Medication 975 MILLIGRAM(S): at 05:10

## 2019-12-13 RX ADMIN — Medication 975 MILLIGRAM(S): at 10:40

## 2019-12-13 RX ADMIN — SIMETHICONE 80 MILLIGRAM(S): 80 TABLET, CHEWABLE ORAL at 15:57

## 2019-12-13 RX ADMIN — Medication 30 MILLIGRAM(S): at 11:42

## 2019-12-13 RX ADMIN — Medication 975 MILLIGRAM(S): at 21:00

## 2019-12-13 RX ADMIN — Medication 30 MILLIGRAM(S): at 18:43

## 2019-12-13 RX ADMIN — Medication 0.2 MILLIGRAM(S): at 09:47

## 2019-12-13 RX ADMIN — Medication 0.2 MILLIGRAM(S): at 13:57

## 2019-12-13 RX ADMIN — Medication 0.2 MILLIGRAM(S): at 06:20

## 2019-12-13 RX ADMIN — Medication 1 TABLET(S): at 11:42

## 2019-12-13 RX ADMIN — Medication 975 MILLIGRAM(S): at 22:00

## 2019-12-13 RX ADMIN — Medication 30 MILLIGRAM(S): at 19:40

## 2019-12-13 RX ADMIN — SIMETHICONE 80 MILLIGRAM(S): 80 TABLET, CHEWABLE ORAL at 11:42

## 2019-12-13 RX ADMIN — Medication 975 MILLIGRAM(S): at 15:00

## 2019-12-13 RX ADMIN — Medication 0.2 MILLIGRAM(S): at 02:22

## 2019-12-13 RX ADMIN — Medication 975 MILLIGRAM(S): at 09:47

## 2019-12-13 RX ADMIN — SIMETHICONE 80 MILLIGRAM(S): 80 TABLET, CHEWABLE ORAL at 21:00

## 2019-12-13 NOTE — PROGRESS NOTE ADULT - ATTENDING COMMENTS
Attending Note   Agree with above   POD 1 s/p pLTCS   doing well   ambulating with no issues   voiding freely   tolerating general diet, no flatus     AFVSS  Gen in NAD   Abd soft, mildly tender, fundus nontender   Ext: 1+ edema b/l   Incision c/d/i     A/P: POD 1 s/p pLTCS doing well   routine postop care    Mine Harvey MD MSc

## 2019-12-13 NOTE — DISCHARGE NOTE OB - PATIENT PORTAL LINK FT
You can access the FollowMyHealth Patient Portal offered by [facility] by registering at the following website: [email or web address]. By joining [facility]’s FollowMyHealth portal, you will also be able to view your health information using other applications (apps) compatible with our system.

## 2019-12-13 NOTE — DISCHARGE NOTE OB - CARE PROVIDER_API CALL
Malika Pan)  Obstetrics and Gynecology  [address] [place] [address]  Phone: [phone]  Fax: [phone]  Follow Up Time:

## 2019-12-13 NOTE — DISCHARGE NOTE OB - HOSPITAL COURSE
30y/o  female  at 40w3d presents to triage c/o irregular contractions.  Upon evaluation, pt was found to have oligohydramnios with an MICHELE of 2.  Admitted and induction with Cytotec sent   Primary  section for category 2 tracing Fetal tachycardia arrest of descend   asynclitism   Female infant

## 2019-12-13 NOTE — DISCHARGE NOTE OB - CARE PLAN
Principal Discharge DX:	Delivery normal  Goal:	postpartum care  Assessment and plan of treatment:	admitted with irregular contractions and noted with oligohydramnious   Induction

## 2019-12-13 NOTE — DISCHARGE NOTE OB - MEDICATION SUMMARY - MEDICATIONS TO TAKE
I will START or STAY ON the medications listed below when I get home from the hospital:    acetaminophen 325 mg oral tablet  -- 3 tab(s) by mouth   -- Indication: For Pain    ibuprofen 600 mg oral tablet  -- 1 tab(s) by mouth every 6 hours  -- Indication: For Pain    Prenatabs Rx oral tablet  -- 1 tab(s) by mouth once a day  -- Indication: For Postpartum

## 2019-12-13 NOTE — DISCHARGE NOTE OB - ADDITIONAL INSTRUCTIONS
If fever , severe pain, heavy bleeding, problems with the incision , severe leg pain, shortness of breath or any other issues call doc or return to hospital

## 2019-12-13 NOTE — PROGRESS NOTE ADULT - SUBJECTIVE AND OBJECTIVE BOX
OB Progress Note:  Delivery, POD#1    S: 28yo POD#1 s/p LTCS . Her pain is well controlled. She is tolerating a regular diet and passing flatus. Denies N/V. Denies CP/SOB/lightheadedness/dizziness.   She is ambulating without difficulty.   Voiding spontaneously.   Denies heavy vaginal bleeding.     O:   Vital Signs Last 24 Hrs  T(C): 36.7 (13 Dec 2019 04:35), Max: 37.3 (12 Dec 2019 19:35)  T(F): 98 (13 Dec 2019 04:35), Max: 99.1 (12 Dec 2019 19:35)  HR: 92 (12 Dec 2019 23:50) (81 - 153)  BP: 103/65 (12 Dec 2019 23:50) (96/51 - 157/88)  BP(mean): 82 (12 Dec 2019 23:05) (64 - 101)  RR: 20 (13 Dec 2019 04:35) (18 - 189)  SpO2: 98% (13 Dec 2019 04:35) (77% - 100%)    Labs:  Blood type: O Positive  Rubella IgG: Positive ( @ 02:43)  RPR: Negative                          10.2<L>   22.76<H> >-----------< 241    (  @ 06:30 )             31.1<L>                        12.4   12.34<H> >-----------< 273    (  @ 02:40 )             38.8            PE:  General: NAD  Abdomen: Mildly distended, appropriately tender, incision c/d/i.  Extremities: No erythema, no pitting edema

## 2019-12-13 NOTE — PROGRESS NOTE ADULT - PROBLEM SELECTOR PLAN 1
- Continue regular diet.  - Increase ambulation, heparin and SCDs  - Continue motrin, tylenol, oxycodone PRN for pain control.    - F/u AM CBC    Omari Pressley PGY1

## 2019-12-14 LAB
BASOPHILS # BLD AUTO: 0.02 K/UL — SIGNIFICANT CHANGE UP (ref 0–0.2)
BASOPHILS NFR BLD AUTO: 0.1 % — SIGNIFICANT CHANGE UP (ref 0–2)
EOSINOPHIL # BLD AUTO: 0.02 K/UL — SIGNIFICANT CHANGE UP (ref 0–0.5)
EOSINOPHIL NFR BLD AUTO: 0.1 % — SIGNIFICANT CHANGE UP (ref 0–6)
HCT VFR BLD CALC: 28.8 % — LOW (ref 34.5–45)
HGB BLD-MCNC: 9.4 G/DL — LOW (ref 11.5–15.5)
IMM GRANULOCYTES NFR BLD AUTO: 1 % — SIGNIFICANT CHANGE UP (ref 0–1.5)
LYMPHOCYTES # BLD AUTO: 1.7 K/UL — SIGNIFICANT CHANGE UP (ref 1–3.3)
LYMPHOCYTES # BLD AUTO: 7.5 % — LOW (ref 13–44)
MANUAL SMEAR VERIFICATION: SIGNIFICANT CHANGE UP
MCHC RBC-ENTMCNC: 28.4 PG — SIGNIFICANT CHANGE UP (ref 27–34)
MCHC RBC-ENTMCNC: 32.6 % — SIGNIFICANT CHANGE UP (ref 32–36)
MCV RBC AUTO: 87 FL — SIGNIFICANT CHANGE UP (ref 80–100)
MONOCYTES # BLD AUTO: 0.94 K/UL — HIGH (ref 0–0.9)
MONOCYTES NFR BLD AUTO: 4.2 % — SIGNIFICANT CHANGE UP (ref 2–14)
NEUTROPHILS # BLD AUTO: 19.75 K/UL — HIGH (ref 1.8–7.4)
NEUTROPHILS NFR BLD AUTO: 87.1 % — HIGH (ref 43–77)
NRBC # FLD: 0 K/UL — SIGNIFICANT CHANGE UP (ref 0–0)
PLATELET # BLD AUTO: 264 K/UL — SIGNIFICANT CHANGE UP (ref 150–400)
PMV BLD: 9.8 FL — SIGNIFICANT CHANGE UP (ref 7–13)
RBC # BLD: 3.31 M/UL — LOW (ref 3.8–5.2)
RBC # FLD: 15.9 % — HIGH (ref 10.3–14.5)
WBC # BLD: 22.65 K/UL — HIGH (ref 3.8–10.5)
WBC # FLD AUTO: 22.65 K/UL — HIGH (ref 3.8–10.5)

## 2019-12-14 RX ADMIN — Medication 600 MILLIGRAM(S): at 13:30

## 2019-12-14 RX ADMIN — HEPARIN SODIUM 5000 UNIT(S): 5000 INJECTION INTRAVENOUS; SUBCUTANEOUS at 14:56

## 2019-12-14 RX ADMIN — Medication 600 MILLIGRAM(S): at 02:47

## 2019-12-14 RX ADMIN — MAGNESIUM HYDROXIDE 30 MILLILITER(S): 400 TABLET, CHEWABLE ORAL at 06:10

## 2019-12-14 RX ADMIN — Medication 600 MILLIGRAM(S): at 23:10

## 2019-12-14 RX ADMIN — Medication 600 MILLIGRAM(S): at 06:08

## 2019-12-14 RX ADMIN — SIMETHICONE 80 MILLIGRAM(S): 80 TABLET, CHEWABLE ORAL at 15:02

## 2019-12-14 RX ADMIN — Medication 600 MILLIGRAM(S): at 07:08

## 2019-12-14 RX ADMIN — SIMETHICONE 80 MILLIGRAM(S): 80 TABLET, CHEWABLE ORAL at 23:10

## 2019-12-14 RX ADMIN — Medication 975 MILLIGRAM(S): at 15:02

## 2019-12-14 RX ADMIN — Medication 600 MILLIGRAM(S): at 01:47

## 2019-12-14 RX ADMIN — HEPARIN SODIUM 5000 UNIT(S): 5000 INJECTION INTRAVENOUS; SUBCUTANEOUS at 01:49

## 2019-12-14 RX ADMIN — Medication 975 MILLIGRAM(S): at 16:00

## 2019-12-14 RX ADMIN — Medication 600 MILLIGRAM(S): at 23:54

## 2019-12-14 RX ADMIN — Medication 600 MILLIGRAM(S): at 18:36

## 2019-12-14 RX ADMIN — SIMETHICONE 80 MILLIGRAM(S): 80 TABLET, CHEWABLE ORAL at 01:47

## 2019-12-14 RX ADMIN — Medication 600 MILLIGRAM(S): at 12:38

## 2019-12-14 RX ADMIN — MAGNESIUM HYDROXIDE 30 MILLILITER(S): 400 TABLET, CHEWABLE ORAL at 18:44

## 2019-12-14 RX ADMIN — Medication 1 TABLET(S): at 12:38

## 2019-12-14 RX ADMIN — Medication 600 MILLIGRAM(S): at 19:06

## 2019-12-14 RX ADMIN — Medication 325 MILLIGRAM(S): at 12:38

## 2019-12-14 NOTE — PROGRESS NOTE ADULT - ASSESSMENT
A/P: 28yo POD#2 s/p LTCS.  Patient is stable and doing well post-operatively.  Awaiting flatus.    - Continue regular diet.  - Increase ambulation.  - Continue motrin, tylenol, oxycodone PRN for pain control.     Holly Palma PGY2

## 2019-12-14 NOTE — PROGRESS NOTE ADULT - SUBJECTIVE AND OBJECTIVE BOX
OB Progress Note: LTCS, POD#2    S: 28yo POD#2 s/p LTCS. Pain is well controlled. She is tolerating a regular diet. Not yet passing flatus. She is voiding spontaneously, and ambulating without difficulty. Denies CP/SOB. Denies lightheadedness/dizziness. Denies N/V.    O:  Vitals:  Vital Signs Last 24 Hrs  T(C): 37.2 (14 Dec 2019 02:05), Max: 37.2 (14 Dec 2019 02:05)  T(F): 98.9 (14 Dec 2019 02:05), Max: 98.9 (14 Dec 2019 02:05)  HR: 107 (14 Dec 2019 02:05) (107 - 120)  BP: 114/75 (14 Dec 2019 02:05) (114/75 - 130/80)  BP(mean): --  RR: 18 (14 Dec 2019 02:05) (18 - 20)  SpO2: 98% (14 Dec 2019 02:05) (97% - 99%)    MEDICATIONS  (STANDING):  acetaminophen   Tablet .. 975 milliGRAM(s) Oral <User Schedule>  diphenoxylate/atropine 2 Tablet(s) Oral once  diphtheria/tetanus/pertussis (acellular) Vaccine (ADAcel) 0.5 milliLiter(s) IntraMuscular once  ferrous    sulfate 325 milliGRAM(s) Oral daily  heparin  Injectable 5000 Unit(s) SubCutaneous every 12 hours  ibuprofen  Tablet. 600 milliGRAM(s) Oral every 6 hours  prenatal multivitamin 1 Tablet(s) Oral daily      MEDICATIONS  (PRN):  butorphanol Injectable 0.125 milliGRAM(s) IV Push every 6 hours PRN Pruritus  diphenhydrAMINE 25 milliGRAM(s) Oral every 6 hours PRN Itching  glycerin Suppository - Adult 1 Suppository(s) Rectal at bedtime PRN Constipation  lanolin Ointment 1 Application(s) Topical every 6 hours PRN Sore Nipples  magnesium hydroxide Suspension 30 milliLiter(s) Oral two times a day PRN Constipation  naloxone Injectable 0.1 milliGRAM(s) IV Push every 3 minutes PRN For ANY of the following changes in patient status:  A. RR LESS THAN 10 breaths per minute, B. Oxygen saturation LESS THAN 90%, C. Sedation score of 6  ondansetron Injectable 4 milliGRAM(s) IV Push every 6 hours PRN Nausea  oxyCODONE    IR 5 milliGRAM(s) Oral every 3 hours PRN Moderate to Severe Pain (4-10)  oxyCODONE    IR 5 milliGRAM(s) Oral once PRN Moderate to Severe Pain (4-10)  simethicone 80 milliGRAM(s) Chew every 4 hours PRN Gas      Labs:  Blood type: O Positive  Rubella IgG: Positive (12-11 @ 02:43)  RPR: Negative                          10.2<L>   22.76<H> >-----------< 241    ( 12-13 @ 06:30 )             31.1<L>                  PE:  General: NAD  Abdomen: Soft, appropriately tender, incision c/d/i.  Extremities: No erythema, no pitting edema

## 2019-12-14 NOTE — PROGRESS NOTE ADULT - SUBJECTIVE AND OBJECTIVE BOX
Postpartum Note,  Section  She is a  29y woman who is now post-operative day:     Subjective:  Feels well; no complaints      Physical exam:    Vital Signs Last 24 Hrs  T(C): 37 (14 Dec 2019 09:33), Max: 37.2 (14 Dec 2019 02:05)  T(F): 98.6 (14 Dec 2019 09:33), Max: 98.9 (14 Dec 2019 02:05)  HR: 103 (14 Dec 2019 09:33) (103 - 112)  BP: 112/75 (14 Dec 2019 09:33) (112/75 - 130/80)  BP(mean): --  RR: 17 (14 Dec 2019 09:33) (17 - 20)  SpO2: 99% (14 Dec 2019 09:33) (97% - 99%)    Gen: NAD  Breast: Soft, nontender, not engorged.  Abdomen: Soft, nontender, no distension , firm uterine fundus at umbilicus.  Incision: Clean, dry, and intact.  Pelvic: Normal lochia noted  Ext: No calf tenderness    LABS:                        9.4    22.65 )-----------( 264      ( 14 Dec 2019 06:45 )             28.8         penicillin (Anaphylaxis)  Robitussin (Anaphylaxis)      MEDICATIONS  (STANDING):  acetaminophen   Tablet .. 975 milliGRAM(s) Oral <User Schedule>  diphenoxylate/atropine 2 Tablet(s) Oral once  diphtheria/tetanus/pertussis (acellular) Vaccine (ADAcel) 0.5 milliLiter(s) IntraMuscular once  ferrous    sulfate 325 milliGRAM(s) Oral daily  heparin  Injectable 5000 Unit(s) SubCutaneous every 12 hours  ibuprofen  Tablet. 600 milliGRAM(s) Oral every 6 hours  prenatal multivitamin 1 Tablet(s) Oral daily    MEDICATIONS  (PRN):  butorphanol Injectable 0.125 milliGRAM(s) IV Push every 6 hours PRN Pruritus  diphenhydrAMINE 25 milliGRAM(s) Oral every 6 hours PRN Itching  glycerin Suppository - Adult 1 Suppository(s) Rectal at bedtime PRN Constipation  lanolin Ointment 1 Application(s) Topical every 6 hours PRN Sore Nipples  magnesium hydroxide Suspension 30 milliLiter(s) Oral two times a day PRN Constipation  naloxone Injectable 0.1 milliGRAM(s) IV Push every 3 minutes PRN For ANY of the following changes in patient status:  A. RR LESS THAN 10 breaths per minute, B. Oxygen saturation LESS THAN 90%, C. Sedation score of 6  ondansetron Injectable 4 milliGRAM(s) IV Push every 6 hours PRN Nausea  oxyCODONE    IR 5 milliGRAM(s) Oral every 3 hours PRN Moderate to Severe Pain (4-10)  oxyCODONE    IR 5 milliGRAM(s) Oral once PRN Moderate to Severe Pain (4-10)  simethicone 80 milliGRAM(s) Chew every 4 hours PRN Gas

## 2019-12-15 VITALS
TEMPERATURE: 99 F | RESPIRATION RATE: 18 BRPM | DIASTOLIC BLOOD PRESSURE: 68 MMHG | HEART RATE: 98 BPM | OXYGEN SATURATION: 99 % | SYSTOLIC BLOOD PRESSURE: 122 MMHG

## 2019-12-15 RX ORDER — OXYCODONE HYDROCHLORIDE 5 MG/1
5 TABLET ORAL
Refills: 0 | Status: DISCONTINUED | OUTPATIENT
Start: 2019-12-15 | End: 2019-12-15

## 2019-12-15 RX ORDER — SENNA PLUS 8.6 MG/1
1 TABLET ORAL ONCE
Refills: 0 | Status: DISCONTINUED | OUTPATIENT
Start: 2019-12-15 | End: 2019-12-15

## 2019-12-15 RX ADMIN — Medication 975 MILLIGRAM(S): at 10:28

## 2019-12-15 RX ADMIN — HEPARIN SODIUM 5000 UNIT(S): 5000 INJECTION INTRAVENOUS; SUBCUTANEOUS at 01:21

## 2019-12-15 RX ADMIN — SIMETHICONE 80 MILLIGRAM(S): 80 TABLET, CHEWABLE ORAL at 09:23

## 2019-12-15 RX ADMIN — Medication 600 MILLIGRAM(S): at 11:15

## 2019-12-15 RX ADMIN — Medication 600 MILLIGRAM(S): at 11:55

## 2019-12-15 RX ADMIN — OXYCODONE HYDROCHLORIDE 5 MILLIGRAM(S): 5 TABLET ORAL at 05:35

## 2019-12-15 RX ADMIN — MAGNESIUM HYDROXIDE 30 MILLILITER(S): 400 TABLET, CHEWABLE ORAL at 05:09

## 2019-12-15 RX ADMIN — Medication 975 MILLIGRAM(S): at 09:23

## 2019-12-15 NOTE — PROGRESS NOTE ADULT - SUBJECTIVE AND OBJECTIVE BOX
SUBJECTIVE:    Pain: Controlled    Complaints: C/O Mild nausea and constipation    MILESTONES:    Alert and Oriented x 3  [ x ]  Out of bed/ ambulating. [ x ]  Flatus:   Positive [ x ]  Negative [  ]  Bowel movement  [  ] Positive [  ] Negative   Voiding [x  ] Due to void [  ]   Teran/Indwelling catheter in place [  ]  Diet: Regular [ x ]  Clears [  ]  NPO [  ]    Infant feeding:  Breast [  ]   Bottle [  ]  Both [ X ]  Feeding related issues and/or concerns:      OBJECTIVE:  T(C): 37.2 (12-15-19 @ 05:39), Max: 37.5 (19 @ 21:58)  HR: 98 (12-15-19 @ 05:39) (98 - 106)  BP: 122/68 (12-15-19 @ 05:39) (109/76 - 130/71)  RR: 18 (12-15-19 @ 05:39) (16 - 18)  SpO2: 99% (12-15-19 @ 05:39) (98% - 99%)  Wt(kg): --                        9.4    22.65 )-----------( 264      ( 14 Dec 2019 06:45 )             28.8           Blood Type: O Positive    RPR: Negative    Rubella IgG: Positive (Positive=Immune, Negative=Non-Immune)        MEDICATIONS  (STANDING):  acetaminophen   Tablet .. 975 milliGRAM(s) Oral <User Schedule>  diphenoxylate/atropine 2 Tablet(s) Oral once  diphtheria/tetanus/pertussis (acellular) Vaccine (ADAcel) 0.5 milliLiter(s) IntraMuscular once  ferrous    sulfate 325 milliGRAM(s) Oral daily  heparin  Injectable 5000 Unit(s) SubCutaneous every 12 hours  ibuprofen  Tablet. 600 milliGRAM(s) Oral every 6 hours  prenatal multivitamin 1 Tablet(s) Oral daily  senna 1 Tablet(s) Oral once    MEDICATIONS  (PRN):  butorphanol Injectable 0.125 milliGRAM(s) IV Push every 6 hours PRN Pruritus  diphenhydrAMINE 25 milliGRAM(s) Oral every 6 hours PRN Itching  glycerin Suppository - Adult 1 Suppository(s) Rectal at bedtime PRN Constipation  lanolin Ointment 1 Application(s) Topical every 6 hours PRN Sore Nipples  magnesium hydroxide Suspension 30 milliLiter(s) Oral two times a day PRN Constipation  naloxone Injectable 0.1 milliGRAM(s) IV Push every 3 minutes PRN For ANY of the following changes in patient status:  A. RR LESS THAN 10 breaths per minute, B. Oxygen saturation LESS THAN 90%, C. Sedation score of 6  ondansetron Injectable 4 milliGRAM(s) IV Push every 6 hours PRN Nausea  oxyCODONE    IR 5 milliGRAM(s) Oral once PRN Moderate to Severe Pain (4-10)  oxyCODONE    IR 5 milliGRAM(s) Oral every 3 hours PRN Moderate to Severe Pain (4-10)  simethicone 80 milliGRAM(s) Chew every 4 hours PRN Gas        ASSESSMENT:    29y     G  1    P  1001       PO Day#  3        Delivery: Primary [X  ]    Repeat [  ]       QBL - 387                                  Indication of procedure:  Arrest    Condition: Stable    Past Medical History significant for: HPI:  29y  female  at 40w3d presents to triage c/o irregular contractions.  Reports +FM, no vaginal bleeding, no ROM or LOF  AP complications: Denies  GBS: Positive (11 Dec 2019 02:21)      Current Issues:    Breasts:  Soft [x  ]   Engorged [  ]  Nipples:  Abdomen: Soft [ x ]   Distended [  ] Nontender [  ]     Bowel sounds :  Present [  ]  Absent [  ]   Fundus:  Firm [x  ]  Boggy [  ]    Abdominal incision: Clean, Dry and Intact [x  ]  Staples [  ] Steri Strips [  ] Dermabond [ X ] Sutures [  ]    Patient wearing abdominal binder for support.    Vaginal: Lochia:  Heavy [  ]  Moderate [ x ]   Scant [  ]    Extremities: Edema [ X ] Negative Constance's Sign [ X ] Nontender Mitch  [ x ] Positive pedal pulses [  ]    Other relevant physical exam findings:      PLAN:    Plan: Increase ambulation, analgesia PRN and pain medication protocol standing oxycodone, ibuprofen and acetaminophen.    Diet: Regular diet    Continue routine post-operative and postpartum care.     Discharge Planning [ x ]    For discharge Today  [  X  ]    Consults:  Social Work [  ]  Lactation [ x ]  Other [         ]

## 2020-01-30 NOTE — OB PROVIDER TRIAGE NOTE - NS_DISPOSITION_OBGYN_ALL_OB
Discharge Alternatives Discussed Intro (Do Not Add Period): I discussed alternative treatments to Mohs surgery and specifically discussed the risks and benefits of

## 2021-04-20 NOTE — OB RN TRIAGE NOTE - NS_TRIAGEINITIALRNASSESSMENT_OBGYN_ALL_OB_FT
natalio duran Post-Care Instructions: I reviewed with the patient in detail post-care instructions. Patient is not to engage in any heavy lifting, exercise, or swimming for the next 14 days. Should the patient develop any fevers, chills, bleeding, severe pain patient will contact the office immediately.

## 2021-09-01 NOTE — H&P ADULT - PROBLEM/PLAN-1
Patient presents to the ED with a complaint of cough, fevers, pain in her chest on inspiration that started 11 days ago. Denies fever now. Per the patient she has been taking tylenol.
DISPLAY PLAN FREE TEXT

## 2021-09-28 NOTE — OB PROVIDER H&P - PULMONARY EMBOLUS
EMERGENCY DEPARTMENT HISTORY AND PHYSICAL EXAM     ------------------------------------------------------------------------------------------------------------------------------------------------------------------------------------  Please note that this dictation was completed with imbookin ([employer]), the Ghost voice recognition software. Quite often unanticipated grammatical, syntax, homophones, and other interpretive errors are inadvertently transcribed by the computer software. Please disregard these errors. Please excuse any errors that have escaped final proofreading  ------------------------------------------------------------------------------------------------------------------------------------------------------------------------------------        Date: 2021  Patient Name: Rosaline Lucero    History of Presenting Illness     Chief Complaint   Patient presents with    Numbness     L side of face and neck, starting at 3PM today, history of [place] Palsy, describes pressure in head and neck       History Provided By:  Patient    HPI: Rosaline Lucero is a 39 y.o. female, pmhx HTN, who presents via private vehicle to the ED with c/o seconds of left-sided facial contraction followed by decrease sensation and paresthesias to the left side of her lips and face. Notes that the decree sensation is persisted but other symptoms has resolved. Noted that this occurred sometime before 3 PM this afternoon. Notes that she was seen at computer when it happened. Denies having turned her neck quickly, fall or any other trauma. Denies having similar symptoms in the past.  Notes having history of Bell's palsy with residual left-sided facial asymmetry. Patient notes that her face looks the same as it did previous to her onset of symptoms and has no reported change.     Last known well: 1445  B   EMS BP: 148/97  Anticoagulant? : NO    Patient specifically denies any associated fevers, chills, nausea, vomiting, diarrhea, abd pain, CP, SOB, urinary sxs, changes in BM    Social Hx: denies tobacco  denies EtOH , denies Illicit Drugs    There are no other complaints, changes, or physical findings at this time. No Known Allergies      PCP: Jaden Berger MD    Current Facility-Administered Medications   Medication Dose Route Frequency Provider Last Rate Last Admin    acetaminophen (TYLENOL) tablet 650 mg  650 mg Oral Q6H PRN Wolf Arias MD        ondansetron [facility]) injection 4 mg  4 mg IntraVENous Q4H PRN Wolf Arias MD        [START ON 9/29/2021] aspirin chewable tablet 81 mg  81 mg Oral DAILY Marie Carmen MD        labetaloL (NORMODYNE;TRANDATE) injection 5 mg  5 mg IntraVENous Q10MIN PRN Wolf Arias MD        polyethylene glycol (MIRALAX) packet 17 g  17 g Oral DAILY PRN Wolf Arias MD        enoxaparin (LOVENOX) injection 40 mg  40 mg SubCUTAneous Q24H Wolf Arias MD         Current Outpatient Medications   Medication Sig Dispense Refill    lisinopriL (PRINIVIL, ZESTRIL) 10 mg tablet Take 1 Tab by mouth daily.  27 Tab 5       Past History     Past Medical History:  Past Medical History:   Diagnosis Date    Club foot     had surgical correction    GERD (gastroesophageal reflux disease)     \"mild\"    Hypertension     Pre-diabetes        Past Surgical History:  Past Surgical History:   Procedure Laterality Date    HX BREAST REDUCTION  2017    HX DILATION AND CURETTAGE  10/2020    HX OTHER SURGICAL  1980s    right club foot surgery, right hip surgery, right knee surgery    HX SPLENECTOMY  1994    spleen laceration  leading to removal of spleen       Family History:  Family History   Problem Relation Age of Onset    Diabetes Mother     Hypertension Mother     Coronary Artery Disease Father        Social History:  Social History     Tobacco Use    Smoking status: Light Tobacco Smoker     Types: Cigars    Smokeless tobacco: Never Used    Tobacco comment: cigar on holidays Substance Use Topics    Alcohol use: Yes     Alcohol/week: 3.0 - 4.0 standard drinks     Types: 3 - 4 Glasses of wine per week    Drug use: Never       Allergies:  No Known Allergies      Review of Systems   Review of Systems   Constitutional: Negative. Negative for fever. Eyes: Negative. Respiratory: Negative. Negative for shortness of breath. Cardiovascular: Negative for chest pain. Gastrointestinal: Negative for abdominal pain, nausea and vomiting. Endocrine: Negative. Genitourinary: Negative. Negative for difficulty urinating, dysuria and hematuria. Musculoskeletal: Negative. Skin: Negative. Neurological: Positive for facial asymmetry (chronic from previous bell's palsy), weakness (chronic L sided facial weakness) and numbness. Negative for dizziness, tremors, syncope and speech difficulty. Psychiatric/Behavioral: Negative for decreased concentration, hallucinations and suicidal ideas. All other systems reviewed and are negative. Physical Exam   Physical Exam  Vitals and nursing note reviewed. Constitutional:       General: She is not in acute distress. Appearance: She is well-developed. She is not diaphoretic. HENT:      Head: Normocephalic and atraumatic. Nose: Nose normal.   Eyes:      General: No visual field deficit or scleral icterus. Conjunctiva/sclera: Conjunctivae normal.   Neck:      Trachea: No tracheal deviation. Cardiovascular:      Rate and Rhythm: Normal rate and regular rhythm. Heart sounds: Normal heart sounds. No murmur heard. No friction rub. Pulmonary:      Effort: Pulmonary effort is normal. No respiratory distress. Breath sounds: Normal breath sounds. No stridor. No wheezing or rales. Abdominal:      General: Bowel sounds are normal. There is no distension. Palpations: Abdomen is soft. Tenderness: There is no abdominal tenderness. There is no rebound. Musculoskeletal:         General: No tenderness. Normal range of motion. Cervical back: Normal range of motion. Skin:     General: Skin is warm and dry. Findings: No rash. Neurological:      Mental Status: She is alert and oriented to person, place, and time. GCS: GCS eye subscore is 4. GCS verbal subscore is 5. GCS motor subscore is 6. Cranial Nerves: Facial asymmetry (reportedly chronic and no different than prior to sx onset today) present. No cranial nerve deficit or dysarthria. Sensory: Sensory deficit (L facial decreased sensation) present. Motor: Motor function is intact. No tremor or abnormal muscle tone. Coordination: Coordination is intact. Gait: Gait is intact. Psychiatric:         Speech: Speech normal.         Behavior: Behavior normal.         Thought Content: Thought content normal.         Judgment: Judgment normal.           Diagnostic Study Results     Labs -     Recent Results (from the past 12 hour(s))   GLUCOSE, POC    Collection Time: 09/28/21  7:48 PM   Result Value Ref Range    Glucose (POC) 142 (H) 65 - 117 mg/dL    Performed by Vianney Justice RN    CBC WITH AUTOMATED DIFF    Collection Time: 09/28/21  7:50 PM   Result Value Ref Range    WBC 15.7 (H) 3.6 - 11.0 K/uL    RBC 4.96 3.80 - 5.20 M/uL    HGB 13.2 11.5 - 16.0 g/dL    HCT 42.5 35.0 - 47.0 %    MCV 85.7 80.0 - 99.0 FL    MCH 26.6 26.0 - 34.0 PG    MCHC 31.1 30.0 - 36.5 g/dL    RDW 15.2 (H) 11.5 - 14.5 %    PLATELET 955 (H) 967 - 400 K/uL    MPV 10.5 8.9 - 12.9 FL    NRBC 0.0 0  WBC    ABSOLUTE NRBC 0.00 0.00 - 0.01 K/uL    NEUTROPHILS 77 (H) 32 - 75 %    LYMPHOCYTES 17 12 - 49 %    MONOCYTES 5 5 - 13 %    EOSINOPHILS 0 0 - 7 %    BASOPHILS 0 0 - 1 %    IMMATURE GRANULOCYTES 1 (H) 0.0 - 0.5 %    ABS. NEUTROPHILS 12.1 (H) 1.8 - 8.0 K/UL    ABS. LYMPHOCYTES 2.7 0.8 - 3.5 K/UL    ABS. MONOCYTES 0.8 0.0 - 1.0 K/UL    ABS. EOSINOPHILS 0.0 0.0 - 0.4 K/UL    ABS. BASOPHILS 0.0 0.0 - 0.1 K/UL    ABS. IMM.  GRANS. 0.1 (H) 0.00 - 0.04 K/UL DF AUTOMATED     METABOLIC PANEL, COMPREHENSIVE    Collection Time: 09/28/21  7:50 PM   Result Value Ref Range    Sodium 139 136 - 145 mmol/L    Potassium 4.1 3.5 - 5.1 mmol/L    Chloride 110 (H) 97 - 108 mmol/L    CO2 27 21 - 32 mmol/L    Anion gap 2 (L) 5 - 15 mmol/L    Glucose 144 (H) 65 - 100 mg/dL    BUN 28 (H) 6 - 20 MG/DL    Creatinine 1.33 (H) 0.55 - 1.02 MG/DL    BUN/Creatinine ratio 21 (H) 12 - 20      GFR est AA 53 (L) >60 ml/min/1.73m2    GFR est non-AA 43 (L) >60 ml/min/1.73m2    Calcium 10.4 (H) 8.5 - 10.1 MG/DL    Bilirubin, total 0.5 0.2 - 1.0 MG/DL    ALT (SGPT) 31 12 - 78 U/L    AST (SGOT) 14 (L) 15 - 37 U/L    Alk.  phosphatase 97 45 - 117 U/L    Protein, total 8.4 (H) 6.4 - 8.2 g/dL    Albumin 4.1 3.5 - 5.0 g/dL    Globulin 4.3 (H) 2.0 - 4.0 g/dL    A-G Ratio 1.0 (L) 1.1 - 2.2     PROTHROMBIN TIME + INR    Collection Time: 09/28/21  7:50 PM   Result Value Ref Range    INR 1.0 0.9 - 1.1      Prothrombin time 10.4 9.0 - 11.1 sec   TROPONIN I    Collection Time: 09/28/21  7:50 PM   Result Value Ref Range    Troponin-I, Qt. <0.05 <0.05 ng/mL   EKG, 12 LEAD, INITIAL    Collection Time: 09/28/21  8:31 PM   Result Value Ref Range    Ventricular Rate 61 BPM    Atrial Rate 61 BPM    P-R Interval 158 ms    QRS Duration 80 ms    Q-T Interval 388 ms    QTC Calculation (Bezet) 390 ms    Calculated P Axis 55 degrees    Calculated R Axis 31 degrees    Calculated T Axis 32 degrees    Diagnosis       Normal sinus rhythm  Possible Left atrial enlargement  Nonspecific T wave abnormality  No previous ECGs available     SED RATE (ESR)    Collection Time: 09/28/21  8:41 PM   Result Value Ref Range    Sed rate, automated 9 0 - 20 mm/hr   D DIMER    Collection Time: 09/28/21  8:41 PM   Result Value Ref Range    D-dimer <0.19 0.00 - 0.65 mg/L FEU       Radiologic Studies -   CTA CODE NEURO HEAD AND NECK W CONT         CT CODE NEURO PERF W CBF         CT CODE NEURO HEAD WO CONTRAST   Final Result   No acute intracranial abnormality. MRI BRAIN WO CONT    (Results Pending)     CT Results  (Last 48 hours)               09/28/21 2006  CTA CODE NEURO HEAD AND NECK W CONT Preliminary result    Narrative:  *PRELIMINARY REPORT*       Exam: CTA head and neck, CT perfusion head. Preliminary findings: Perfusion mismatch: 0 cc. CTA shows no large vessel   occlusion, thrombosis, flow-limiting stenosis, aneurysm or dissection. Dural   venous sinuses are patent. Incidental note of thyromegaly with thyroid nodules significant on the right. Preliminary report was provided by Dr. Magdalena Lopez, the on-call radiologist, at   2043 hours       Final report to follow. *END PRELIMINARY REPORT*                               09/28/21 2006  CT CODE NEURO PERF W CBF Preliminary result    Narrative:  *PRELIMINARY REPORT*       Exam: CTA head and neck, CT perfusion head. Preliminary findings: Perfusion mismatch: 0 cc. CTA shows no large vessel   occlusion, thrombosis, flow-limiting stenosis, aneurysm or dissection. Dural   venous sinuses are patent. Incidental note of thyromegaly with thyroid nodules significant on the right. Preliminary report was provided by Dr. Magdalena Lopez, the on-call radiologist, at   2043 hours       Final report to follow. *END PRELIMINARY REPORT*                               09/28/21 1954  CT CODE NEURO HEAD WO CONTRAST Final result    Impression:  No acute intracranial abnormality. Narrative:  EXAM: CT CODE NEURO HEAD WO CONTRAST       INDICATION: transient L sided facial numbness       COMPARISON: None. CONTRAST: None. TECHNIQUE: Unenhanced CT of the head was performed using 5 mm images. Brain and   bone windows were generated. Coronal and sagittal reformats. CT dose reduction   was achieved through use of a standardized protocol tailored for this   examination and automatic exposure control for dose modulation.          FINDINGS:   The ventricles and sulci are normal in size, shape and configuration. . There is   no significant white matter disease. There is no intracranial hemorrhage,   extra-axial collection, or mass effect. The basilar cisterns are open. No CT   evidence of acute infarct. The bone windows demonstrate no abnormalities. The visualized portions of the   paranasal sinuses and mastoid air cells are clear. CXR Results  (Last 48 hours)    None            Medical Decision Making   I am the first provider for this patient. I reviewed the vital signs, available nursing notes, past medical history, past surgical history, family history and social history. Vital Signs-Reviewed the patient's vital signs. Patient Vitals for the past 12 hrs:   Temp Pulse Resp BP SpO2   09/28/21 2115 -- (!) 59 18 119/82 99 %   09/28/21 2100 -- 65 13 (!) 113/44 100 %   09/28/21 2045 -- 60 16 118/77 99 %   09/28/21 2030 -- 60 11 117/65 100 %   09/28/21 1928 98.5 °F (36.9 °C) 77 18 (!) 148/97 100 %       Pulse Oximetry Analysis - 100% on RA, normal    Cardiac Monitor:   Rate: 77 bpm  Rhythm: Normal sinus    Records Reviewed/interpreted: Nursing Notes from initial triage and Old Medical Records any previous orthopedic visits for pain management with Dr. Sharlene Cobos for lumbosacral spondylosis without myelopathy    Provider Notes (Medical Decision Making):     DDX:  ICH, Occlusive stroke, electrolyte abnormalities, arrhythmia    Plan:  Code S head ct, labs, ekg, Teleneuro consult    Impression:  Facial paresthesias    ED Course:   Initial assessment performed. The patients presenting problems have been discussed, and they are in agreement with the care plan formulated and outlined with them. I have encouraged them to ask questions as they arise throughout their visit.     I reviewed our electronic medical record system for any past medical records that were available that may contribute to the patients current condition, the nursing notes and and vital signs from today's visit    Nursing notes will be reviewed as they become available in realtime while the pt has been in the ED. Tushar Luo MD      I personally reviewed pt's imaging. Official read by radiology noted above. Tushar Luo MD      CONSULT NOTE:   5099  Tushar Luo MD spoke with Dr. Beau Light,   Specialty: Zak Light due to paresthesias with left-sided facial decrease sensation. Discussed pt's HPI and available diagnostic results thus far. Expressed concerns for needed evaluation. Consultant will evaluate pt via Teleneuro device and provide recommendations. Tushar Luo MD    PROGRESS NOTE  2026  Dr. Beau Light recommends providing the patient with an aspirin with planned admission for further work-up with MRI/MRA. Recommends sending CRP/sed rate/D-dimer as part of her further work-up for potential temporal arteritis. Awaiting remainder of lab tests to admit to hospitalist.    CONSULT NOTE:   10:22 Mavis Avina MD spoke with Dr. Carlos Aguilar,   Specialty: hTaddeus Aguilar due to tia like sx. Discussed pt's HPI and available diagnostic results thus far. Expressed concerns for needed admission. Consultant will evaluate for admission. Tushar Luo MD      ADMISSION NOTE:  10:22 PM  Patient is being admitted to the hospital by Dr. Carlos Aguilar. The results of their tests and reasons for their admission have been discussed with them and/or available family. They convey agreement and understanding for the need to be admitted and for their admission diagnosis.    Tushar Luo MD            Critical Care Time:   CRITICAL CARE NOTE:  IMPENDING DETERIORATION -Airway, Respiratory, Cardiovascular, CNS, and Metabolic  ASSOCIATED RISK FACTORS - Hypotension, Bleeding, Dysrhythmia, Metabolic changes, Dehydration, Vascular Compromise, and CNS Decompensation  MANAGEMENT- Bedside Assessment and Supervision of Care  INTERPRETATION -  Xrays, CT Scan, ECG, and Blood Pressure  INTERVENTIONS - hemodynamic mngmt, vascular control, Neurologic interventions , and Metobolic interventions  CASE REVIEW - Hospitalist, Medical Sub-Specialist, Nursing, and Family  TREATMENT RESPONSE -Improved  PERFORMED BY - Self  NOTES   :  I have spent 60 minutes of critical care time involved in lab review, consultations with specialist, family decision- making, bedside attention and documentation excluding time spent on any separately billed procedures. During this entire length of time I was immediately available to the patient . Lucretia Shell MD           Diagnosis     Clinical Impression:   1. Facial paresthesia        [facility]    ADMISSION NOTE:  Patient is being admitted to the hospital by Dr. Michael Serrano. The results of their tests and reasons for their admission have been discussed with them and/or available family. They convey agreement and understanding for the need to be admitted and for their admission diagnosis.    Lucretia Shell MD no

## 2021-12-02 ENCOUNTER — NON-APPOINTMENT (OUTPATIENT)
Age: 31
End: 2021-12-02

## 2021-12-02 PROBLEM — I89.1 LYMPHANGITIS: Chronic | Status: ACTIVE | Noted: 2019-12-11

## 2021-12-02 PROBLEM — L03.90 CELLULITIS, UNSPECIFIED: Chronic | Status: ACTIVE | Noted: 2019-12-11

## 2021-12-02 PROBLEM — E28.2 POLYCYSTIC OVARIAN SYNDROME: Chronic | Status: ACTIVE | Noted: 2019-12-11

## 2022-01-24 ENCOUNTER — NON-APPOINTMENT (OUTPATIENT)
Age: 32
End: 2022-01-24

## 2022-01-24 ENCOUNTER — APPOINTMENT (OUTPATIENT)
Dept: INTERNAL MEDICINE | Facility: CLINIC | Age: 32
End: 2022-01-24
Payer: COMMERCIAL

## 2022-01-24 VITALS
DIASTOLIC BLOOD PRESSURE: 87 MMHG | HEART RATE: 84 BPM | OXYGEN SATURATION: 100 % | WEIGHT: 188 LBS | HEIGHT: 61 IN | BODY MASS INDEX: 35.5 KG/M2 | SYSTOLIC BLOOD PRESSURE: 126 MMHG

## 2022-01-24 DIAGNOSIS — Z78.9 OTHER SPECIFIED HEALTH STATUS: ICD-10-CM

## 2022-01-24 DIAGNOSIS — Z82.49 FAMILY HISTORY OF ISCHEMIC HEART DISEASE AND OTHER DISEASES OF THE CIRCULATORY SYSTEM: ICD-10-CM

## 2022-01-24 PROCEDURE — 93000 ELECTROCARDIOGRAM COMPLETE: CPT

## 2022-01-24 PROCEDURE — 99385 PREV VISIT NEW AGE 18-39: CPT | Mod: 25

## 2022-01-24 PROCEDURE — 36415 COLL VENOUS BLD VENIPUNCTURE: CPT

## 2022-01-24 NOTE — HISTORY OF PRESENT ILLNESS
Refill pending Physician approval, PCP please route back after completed   
[de-identified] : 31 year old female presents for initial annual exam.\par \par h/o PCOS, followed by endo. on metformin.  and PNV\par \par , 1 child \par employed \par non-smoker \par \par \par

## 2022-01-24 NOTE — ASSESSMENT
[FreeTextEntry1] : 31 year old female presents for initial annual exam.\par \par PCN allergy-ananphylaxis \par -allergy referral \par \par PCOS, \par -Focus on healthy diet and exercise\par -Continue Metformin\par \par Borderline hyperlipidemia\par -Will check labs \par -Advised decrease greasy, fatty foods, increase exercise, fiber intake Amerge\par -Elevated cholesterol has been linked to increase in cardiovascular even such as heart attack, stroke, peripheral artery disease and death\par \par Annual \par -Advise to get yearly Flu shot\par -Advise Yearly Eye exam with Ophthalmologist\par -Advise Yearly Dental exam\par -Educated of the importance of Healthy diet, such as Mediterranean Diet and Exercise, such as walking >20 minutes a day and increasing gradually as tolerated\par \par Preventative screening \par -advised to get PAP for cervical cancer screening- -up to date, needs repeat \par \par \par \par \par \par

## 2022-01-24 NOTE — PHYSICAL EXAM
[Normal] : normal rate, regular rhythm, normal S1 and S2 and no murmur heard [Pedal Pulses Present] : the pedal pulses are present [No Edema] : there was no peripheral edema [No Extremity Clubbing/Cyanosis] : no extremity clubbing/cyanosis [Soft] : abdomen soft [Non Tender] : non-tender [Non-distended] : non-distended [Normal Posterior Cervical Nodes] : no posterior cervical lymphadenopathy [Normal Anterior Cervical Nodes] : no anterior cervical lymphadenopathy [No CVA Tenderness] : no CVA  tenderness [No Spinal Tenderness] : no spinal tenderness [No Joint Swelling] : no joint swelling [Grossly Normal Strength/Tone] : grossly normal strength/tone [No Skin Lesions] : no skin lesions

## 2022-01-24 NOTE — HEALTH RISK ASSESSMENT
[Good] : ~his/her~  mood as  good [Never] : Never [No] : In the past 12 months have you used drugs other than those required for medical reasons? No [No falls in past year] : Patient reported no falls in the past year [0] : 2) Feeling down, depressed, or hopeless: Not at all (0) [Patient reported PAP Smear was normal] : Patient reported PAP Smear was normal [With Family] : lives with family [[id number]] : 0 [Change in mental status noted] : No change in mental status noted [PapSmearDate] : 2019

## 2022-01-25 ENCOUNTER — CLINICAL ADVICE (OUTPATIENT)
Age: 32
End: 2022-01-25

## 2022-01-25 DIAGNOSIS — L60.0 INGROWING NAIL: ICD-10-CM

## 2022-01-25 DIAGNOSIS — L03.115 CELLULITIS OF RIGHT LOWER LIMB: ICD-10-CM

## 2022-01-25 DIAGNOSIS — Z3A.21 21 WEEKS GESTATION OF PREGNANCY: ICD-10-CM

## 2022-01-25 DIAGNOSIS — E55.9 VITAMIN D DEFICIENCY, UNSPECIFIED: ICD-10-CM

## 2022-01-25 LAB
25(OH)D3 SERPL-MCNC: 25.4 NG/ML
ALBUMIN SERPL ELPH-MCNC: 4.6 G/DL
ALP BLD-CCNC: 88 U/L
ALT SERPL-CCNC: 20 U/L
ANION GAP SERPL CALC-SCNC: 13 MMOL/L
APPEARANCE: CLEAR
AST SERPL-CCNC: 14 U/L
BACTERIA: NEGATIVE
BASOPHILS # BLD AUTO: 0.05 K/UL
BASOPHILS NFR BLD AUTO: 0.5 %
BILIRUB DIRECT SERPL-MCNC: 0.1 MG/DL
BILIRUB INDIRECT SERPL-MCNC: 0.1 MG/DL
BILIRUB SERPL-MCNC: 0.2 MG/DL
BILIRUBIN URINE: NEGATIVE
BLOOD URINE: NEGATIVE
BUN SERPL-MCNC: 11 MG/DL
CALCIUM SERPL-MCNC: 9.2 MG/DL
CHLORIDE SERPL-SCNC: 103 MMOL/L
CHOLEST SERPL-MCNC: 176 MG/DL
CO2 SERPL-SCNC: 24 MMOL/L
COLOR: NORMAL
CREAT SERPL-MCNC: 0.76 MG/DL
EOSINOPHIL # BLD AUTO: 0.22 K/UL
EOSINOPHIL NFR BLD AUTO: 2.1 %
ESTIMATED AVERAGE GLUCOSE: 103 MG/DL
FERRITIN SERPL-MCNC: 29 NG/ML
FOLATE SERPL-MCNC: >20 NG/ML
GLUCOSE QUALITATIVE U: NEGATIVE
GLUCOSE SERPL-MCNC: 80 MG/DL
HBA1C MFR BLD HPLC: 5.2 %
HCT VFR BLD CALC: 42.8 %
HCV AB SER QL: NONREACTIVE
HCV S/CO RATIO: 0.47 S/CO
HDLC SERPL-MCNC: 44 MG/DL
HGB BLD-MCNC: 13.4 G/DL
HIV1+2 AB SPEC QL IA.RAPID: NONREACTIVE
HYALINE CASTS: 0 /LPF
IMM GRANULOCYTES NFR BLD AUTO: 0.4 %
IRON SATN MFR SERPL: 11 %
IRON SERPL-MCNC: 36 UG/DL
KETONES URINE: NEGATIVE
LDLC SERPL CALC-MCNC: 115 MG/DL
LEUKOCYTE ESTERASE URINE: NEGATIVE
LYMPHOCYTES # BLD AUTO: 3.21 K/UL
LYMPHOCYTES NFR BLD AUTO: 30.8 %
MAN DIFF?: NORMAL
MCHC RBC-ENTMCNC: 27.2 PG
MCHC RBC-ENTMCNC: 31.3 GM/DL
MCV RBC AUTO: 87 FL
MICROSCOPIC-UA: NORMAL
MONOCYTES # BLD AUTO: 0.44 K/UL
MONOCYTES NFR BLD AUTO: 4.2 %
NEUTROPHILS # BLD AUTO: 6.45 K/UL
NEUTROPHILS NFR BLD AUTO: 62 %
NITRITE URINE: NEGATIVE
NONHDLC SERPL-MCNC: 132 MG/DL
PH URINE: 6
PLATELET # BLD AUTO: 345 K/UL
POTASSIUM SERPL-SCNC: 3.9 MMOL/L
PROT SERPL-MCNC: 6.9 G/DL
PROTEIN URINE: NEGATIVE
RBC # BLD: 4.92 M/UL
RBC # FLD: 14.6 %
RED BLOOD CELLS URINE: 4 /HPF
SODIUM SERPL-SCNC: 140 MMOL/L
SPECIFIC GRAVITY URINE: 1.02
SQUAMOUS EPITHELIAL CELLS: 2 /HPF
TIBC SERPL-MCNC: 325 UG/DL
TRIGL SERPL-MCNC: 86 MG/DL
TSH SERPL-ACNC: 0.93 UIU/ML
UIBC SERPL-MCNC: 290 UG/DL
UROBILINOGEN URINE: NORMAL
VIT B12 SERPL-MCNC: 263 PG/ML
WBC # FLD AUTO: 10.41 K/UL
WHITE BLOOD CELLS URINE: 1 /HPF

## 2022-01-25 RX ORDER — ERGOCALCIFEROL 1.25 MG/1
1.25 MG CAPSULE, LIQUID FILLED ORAL
Qty: 12 | Refills: 3 | Status: ACTIVE | COMMUNITY
Start: 2022-01-25 | End: 1900-01-01

## 2022-04-05 ENCOUNTER — TRANSCRIPTION ENCOUNTER (OUTPATIENT)
Age: 32
End: 2022-04-05

## 2022-04-11 ENCOUNTER — APPOINTMENT (OUTPATIENT)
Dept: DERMATOLOGY | Facility: CLINIC | Age: 32
End: 2022-04-11
Payer: COMMERCIAL

## 2022-04-11 VITALS — WEIGHT: 190 LBS | BODY MASS INDEX: 35.87 KG/M2 | HEIGHT: 61 IN

## 2022-04-11 DIAGNOSIS — R20.8 OTHER DISTURBANCES OF SKIN SENSATION: ICD-10-CM

## 2022-04-11 DIAGNOSIS — D22.9 MELANOCYTIC NEVI, UNSPECIFIED: ICD-10-CM

## 2022-04-11 DIAGNOSIS — Z12.83 ENCOUNTER FOR SCREENING FOR MALIGNANT NEOPLASM OF SKIN: ICD-10-CM

## 2022-04-11 DIAGNOSIS — L80 VITILIGO: ICD-10-CM

## 2022-04-11 PROCEDURE — 99204 OFFICE O/P NEW MOD 45 MIN: CPT

## 2022-04-14 RX ORDER — TACROLIMUS 1 MG/G
0.1 OINTMENT TOPICAL
Qty: 1 | Refills: 0 | Status: ACTIVE | COMMUNITY
Start: 2022-04-11

## 2022-06-10 NOTE — ED ADULT NURSE NOTE - OBJECTIVE STATEMENT
wheelchair
Patient reports "cramping on and off x 3 weeks. Spotting today and last week.. " Patient reports diarrhea today.  Patient denies dysuria. + chills. LMP 19 [id number] EDC 19. patient completed macrobid yesterday for UTI

## 2022-06-14 ENCOUNTER — APPOINTMENT (OUTPATIENT)
Dept: DERMATOLOGY | Facility: CLINIC | Age: 32
End: 2022-06-14

## 2023-02-27 NOTE — ED CDU PROVIDER INITIAL DAY NOTE - TOBACCO USE
[Initial Consultation] : an initial consultation for [FreeTextEntry2] : thrombocytopenia  Never smoker

## 2023-02-28 ENCOUNTER — APPOINTMENT (OUTPATIENT)
Dept: DERMATOLOGY | Facility: CLINIC | Age: 33
End: 2023-02-28

## 2023-03-06 NOTE — ED ADULT NURSE NOTE - NS ED NURSE RECORD ANOTHER HT AND WT
Detail Level: Detailed
Reason To Defer Override: surgical excision
Procedure To Be Performed At Next Visit: Excision
Size Of Lesion In Cm (Optional): 0.8
Introduction Text (Please End With A Colon): The following procedure was deferred:
No

## 2023-04-27 NOTE — ED PROVIDER NOTE - CHPI ED SYMPTOMS NEG
no fever
as per pt's family members, they did not want to keep pt in hospital due to pt exhibiting signs of delirium. decided to take pt home and follow up outpatient. MD notified, IV removed

## 2023-07-26 NOTE — OB PROVIDER DELIVERY SUMMARY - NS_ADMITROM_OBGYN_ALL_OB
63-year-old female with past medical history of anxiety and hypertension who presents to the ED with joint pain.  Reports that she has been having pain in her joints for the past 4 days and she is really here today for blood work.  Reports that she had preop blood work done 2 days ago and thinks that her beta globulin level was abnormal, so she wants to get tested again in the ER.  I have reviewed patient's lab work with the patient and shows that her beta globulin level is actually within the normal range.  Denies fever, shortness of breath, chest pain, nausea, vomit, abdominal pain, urinary symptoms, and change with bowel movement. No

## 2023-12-07 ENCOUNTER — NON-APPOINTMENT (OUTPATIENT)
Age: 33
End: 2023-12-07

## 2023-12-13 ENCOUNTER — APPOINTMENT (OUTPATIENT)
Dept: DERMATOLOGY | Facility: CLINIC | Age: 33
End: 2023-12-13
Payer: COMMERCIAL

## 2023-12-13 VITALS — WEIGHT: 190 LBS | HEIGHT: 61 IN | BODY MASS INDEX: 35.87 KG/M2

## 2023-12-13 PROCEDURE — 99213 OFFICE O/P EST LOW 20 MIN: CPT

## 2023-12-13 NOTE — PHYSICAL EXAM
[FreeTextEntry3] : Focused skin exam performed  The relevant portions of the exam were performed today  AAOx3, NAD, well-appearing / pleasant Focused examination within normal limits with the exception of:  Right cheek with skin colored papule with central punctum  Left nape of neck with pink subcutaneous papule with central punctum

## 2023-12-13 NOTE — HISTORY OF PRESENT ILLNESS
[FreeTextEntry1] : RP bumps [de-identified] : VIPIN HERZOG is a 33 year old F who presents for evaluation of the following  1. Bump on right cheek noticed about a month ago, bump on left neck noticed this morning. Not growing/changing. Not draining. A little tender at times. Pt reports she gets a lot of cysts but wants to make sure these are also cysts.

## 2023-12-13 NOTE — ASSESSMENT
[FreeTextEntry1] : #Cysts of skin, small, favored given central punctums - Disc diagnostic and treatment options, including biopsy/excision, ILK, vs observation - Pt defers treatment at this time, will self-monitor and if growing or changing will RTC for re-eval  RTC prn

## 2024-01-29 ENCOUNTER — NON-APPOINTMENT (OUTPATIENT)
Age: 34
End: 2024-01-29

## 2024-01-29 ENCOUNTER — APPOINTMENT (OUTPATIENT)
Dept: INTERNAL MEDICINE | Facility: CLINIC | Age: 34
End: 2024-01-29
Payer: COMMERCIAL

## 2024-01-29 VITALS
DIASTOLIC BLOOD PRESSURE: 84 MMHG | HEART RATE: 92 BPM | BODY MASS INDEX: 35.68 KG/M2 | WEIGHT: 189 LBS | HEIGHT: 61 IN | SYSTOLIC BLOOD PRESSURE: 121 MMHG

## 2024-01-29 DIAGNOSIS — E28.2 POLYCYSTIC OVARIAN SYNDROME: ICD-10-CM

## 2024-01-29 DIAGNOSIS — Z00.00 ENCOUNTER FOR GENERAL ADULT MEDICAL EXAMINATION W/OUT ABNORMAL FINDINGS: ICD-10-CM

## 2024-01-29 DIAGNOSIS — Z23 ENCOUNTER FOR IMMUNIZATION: ICD-10-CM

## 2024-01-29 PROCEDURE — 93000 ELECTROCARDIOGRAM COMPLETE: CPT

## 2024-01-29 PROCEDURE — 99395 PREV VISIT EST AGE 18-39: CPT | Mod: 25

## 2024-01-29 PROCEDURE — 90686 IIV4 VACC NO PRSV 0.5 ML IM: CPT

## 2024-01-29 PROCEDURE — G0008: CPT

## 2024-01-29 PROCEDURE — 36415 COLL VENOUS BLD VENIPUNCTURE: CPT

## 2024-01-29 RX ORDER — METFORMIN HYDROCHLORIDE 500 MG/1
500 TABLET, COATED ORAL DAILY
Qty: 90 | Refills: 1 | Status: ACTIVE | COMMUNITY
Start: 2024-01-29

## 2024-01-31 LAB
25(OH)D3 SERPL-MCNC: 17.7 NG/ML
ALBUMIN SERPL ELPH-MCNC: 4.3 G/DL
ALP BLD-CCNC: 105 U/L
ALT SERPL-CCNC: 15 U/L
ANION GAP SERPL CALC-SCNC: 13 MMOL/L
APPEARANCE: CLEAR
AST SERPL-CCNC: 15 U/L
BACTERIA: NEGATIVE /HPF
BASOPHILS # BLD AUTO: 0.05 K/UL
BASOPHILS NFR BLD AUTO: 0.5 %
BILIRUB DIRECT SERPL-MCNC: 0.1 MG/DL
BILIRUB INDIRECT SERPL-MCNC: 0.1 MG/DL
BILIRUB SERPL-MCNC: 0.2 MG/DL
BILIRUBIN URINE: NEGATIVE
BLOOD URINE: ABNORMAL
BUN SERPL-MCNC: 9 MG/DL
CALCIUM SERPL-MCNC: 9.4 MG/DL
CAST: 0 /LPF
CHLORIDE SERPL-SCNC: 103 MMOL/L
CHOLEST SERPL-MCNC: 206 MG/DL
CO2 SERPL-SCNC: 24 MMOL/L
COLOR: YELLOW
CREAT SERPL-MCNC: 0.68 MG/DL
EGFR: 117 ML/MIN/1.73M2
EOSINOPHIL # BLD AUTO: 0.32 K/UL
EOSINOPHIL NFR BLD AUTO: 3 %
EPITHELIAL CELLS: 1 /HPF
ESTIMATED AVERAGE GLUCOSE: 103 MG/DL
FERRITIN SERPL-MCNC: 48 NG/ML
FOLATE SERPL-MCNC: 13.5 NG/ML
GLUCOSE QUALITATIVE U: NEGATIVE MG/DL
GLUCOSE SERPL-MCNC: 76 MG/DL
HBA1C MFR BLD HPLC: 5.2 %
HCT VFR BLD CALC: 39.4 %
HDLC SERPL-MCNC: 41 MG/DL
HGB BLD-MCNC: 12.7 G/DL
IMM GRANULOCYTES NFR BLD AUTO: 0.3 %
INSULIN SERPL-MCNC: 8.1 UU/ML
IRON SATN MFR SERPL: 11 %
IRON SERPL-MCNC: 39 UG/DL
KETONES URINE: NEGATIVE MG/DL
LDLC SERPL CALC-MCNC: 148 MG/DL
LEUKOCYTE ESTERASE URINE: NEGATIVE
LYMPHOCYTES # BLD AUTO: 3.33 K/UL
LYMPHOCYTES NFR BLD AUTO: 30.9 %
MAN DIFF?: NORMAL
MCHC RBC-ENTMCNC: 26 PG
MCHC RBC-ENTMCNC: 32.2 GM/DL
MCV RBC AUTO: 80.7 FL
MICROSCOPIC-UA: NORMAL
MONOCYTES # BLD AUTO: 0.42 K/UL
MONOCYTES NFR BLD AUTO: 3.9 %
NEUTROPHILS # BLD AUTO: 6.63 K/UL
NEUTROPHILS NFR BLD AUTO: 61.4 %
NITRITE URINE: NEGATIVE
NONHDLC SERPL-MCNC: 164 MG/DL
PH URINE: 5.5
PLATELET # BLD AUTO: 339 K/UL
POTASSIUM SERPL-SCNC: 4.1 MMOL/L
PROT SERPL-MCNC: 6.9 G/DL
PROTEIN URINE: NEGATIVE MG/DL
RBC # BLD: 4.88 M/UL
RBC # FLD: 14.6 %
RED BLOOD CELLS URINE: 4 /HPF
SODIUM SERPL-SCNC: 139 MMOL/L
SPECIFIC GRAVITY URINE: 1.02
T4 FREE SERPL-MCNC: 1.2 NG/DL
TESTOST SERPL-MCNC: 23.1 NG/DL
TIBC SERPL-MCNC: 341 UG/DL
TRIGL SERPL-MCNC: 88 MG/DL
TSH SERPL-ACNC: 0.86 UIU/ML
UIBC SERPL-MCNC: 302 UG/DL
UROBILINOGEN URINE: 0.2 MG/DL
VIT B12 SERPL-MCNC: 254 PG/ML
WBC # FLD AUTO: 10.78 K/UL
WHITE BLOOD CELLS URINE: 1 /HPF

## 2024-01-31 NOTE — HEALTH RISK ASSESSMENT
[Good] : ~his/her~  mood as  good [No falls in past year] : Patient reported no falls in the past year [0] : 2) Feeling down, depressed, or hopeless: Not at all (0) [PHQ-2 Negative - No further assessment needed] : PHQ-2 Negative - No further assessment needed [Patient reported PAP Smear was normal] : Patient reported PAP Smear was normal [] :  [Fully functional (bathing, dressing, toileting, transferring, walking, feeding)] : Fully functional (bathing, dressing, toileting, transferring, walking, feeding) [Fully functional (using the telephone, shopping, preparing meals, housekeeping, doing laundry, using] : Fully functional and needs no help or supervision to perform IADLs (using the telephone, shopping, preparing meals, housekeeping, doing laundry, using transportation, managing medications and managing finances) [Never] : Never [[id number]] : 0 [Change in mental status noted] : No change in mental status noted [Reports changes in hearing] : Reports no changes in hearing [Reports changes in vision] : Reports no changes in vision [PapSmearDate] : 2024

## 2024-01-31 NOTE — ASSESSMENT
[FreeTextEntry1] : //  PCOS, Obesity -Being overweight increases your risk of health conditions such as heart disease, high blood pressure, type 2 diabetes, and certain types of cancer. It can also increase your risk for osteoarthritis, sleep apnea, and other respiratory problems. Aim for a slow, steady weight loss. Even a small amount of weight loss can lower your risk of health problems. -A safe and healthy way to lose weight is to eat fewer calories and get regular exercise. You can lose up about 1 pound a week by decreasing the number of calories you eat by 500 calories each day. You can decrease calories by eating smaller portion sizes or by cutting out high-calorie foods. Read labels to find out how many calories are in the foods you eat. You can also burn calories with exercise such as walking, swimming, or biking. You will be more likely to keep weight off if you make these changes part of your lifestyle. -Exercise at least 30 minutes per day on most days of the week. Some examples of exercise include walking, biking, dancing, and swimming. You can also fit in more physical activity by taking the stairs instead of the elevator or parking farther away from stores.  Healthcare Maintenance -Advise Yearly Skin cancer screening with Dermatologist  -Advise Yearly Eye exam with Ophthalmologist -Advise Yearly Dental exam -Educated of the importance of Healthy diet, such as Mediterranean Diet and Exercise, such as walking >20 minutes a day and increasing gradually as tolerated  Immunizations -Flu vaccine -given today  -Covid vaccine

## 2024-01-31 NOTE — HISTORY OF PRESENT ILLNESS
[de-identified] : 33 year old female presents for annual exam.  h/o PCOS, followed by endo. on metformin.  and PNV  , 1 child  employed -billing non-smoker

## 2024-02-01 DIAGNOSIS — Z91.018 ALLERGY TO OTHER FOODS: ICD-10-CM

## 2024-02-01 RX ORDER — EPINEPHRINE 0.3 MG/.3ML
0.3 INJECTION INTRAMUSCULAR
Qty: 1 | Refills: 3 | Status: ACTIVE | COMMUNITY
Start: 2024-02-01 | End: 1900-01-01

## 2024-02-08 ENCOUNTER — NON-APPOINTMENT (OUTPATIENT)
Age: 34
End: 2024-02-08

## 2024-02-09 ENCOUNTER — APPOINTMENT (OUTPATIENT)
Dept: DERMATOLOGY | Facility: CLINIC | Age: 34
End: 2024-02-09
Payer: COMMERCIAL

## 2024-02-09 DIAGNOSIS — L72.0 EPIDERMAL CYST: ICD-10-CM

## 2024-02-09 DIAGNOSIS — D23.9 OTHER BENIGN NEOPLASM OF SKIN, UNSPECIFIED: ICD-10-CM

## 2024-02-09 DIAGNOSIS — L72.9 FOLLICULAR CYST OF THE SKIN AND SUBCUTANEOUS TISSUE, UNSPECIFIED: ICD-10-CM

## 2024-02-09 DIAGNOSIS — H02.829 CYSTS OF UNSPECIFIED EYE, UNSPECIFIED EYELID: ICD-10-CM

## 2024-02-09 DIAGNOSIS — D22.9 MELANOCYTIC NEVI, UNSPECIFIED: ICD-10-CM

## 2024-02-09 PROCEDURE — 99213 OFFICE O/P EST LOW 20 MIN: CPT

## 2024-02-23 ENCOUNTER — TRANSCRIPTION ENCOUNTER (OUTPATIENT)
Age: 34
End: 2024-02-23

## 2024-02-26 ENCOUNTER — TRANSCRIPTION ENCOUNTER (OUTPATIENT)
Age: 34
End: 2024-02-26

## 2024-03-26 ENCOUNTER — APPOINTMENT (OUTPATIENT)
Dept: INTERNAL MEDICINE | Facility: CLINIC | Age: 34
End: 2024-03-26
Payer: COMMERCIAL

## 2024-03-26 VITALS
HEART RATE: 96 BPM | OXYGEN SATURATION: 98 % | DIASTOLIC BLOOD PRESSURE: 94 MMHG | HEIGHT: 61 IN | SYSTOLIC BLOOD PRESSURE: 134 MMHG | WEIGHT: 199.13 LBS | BODY MASS INDEX: 37.59 KG/M2

## 2024-03-26 DIAGNOSIS — R51.9 HEADACHE, UNSPECIFIED: ICD-10-CM

## 2024-03-26 PROCEDURE — 36415 COLL VENOUS BLD VENIPUNCTURE: CPT

## 2024-03-26 PROCEDURE — G2211 COMPLEX E/M VISIT ADD ON: CPT | Mod: NC

## 2024-03-26 PROCEDURE — 99214 OFFICE O/P EST MOD 30 MIN: CPT

## 2024-03-27 NOTE — ASSESSMENT
[FreeTextEntry1] : //  Abdominal bloating, cramping, distention.  Normal exam.  h/o PCOS -Check labs -Ultrasound abdomen -Fodmap diet  Headaches, history of migraines in the past. -Neuro referral -Maintain adequate hydration -NSAIDs as needed

## 2024-03-27 NOTE — HISTORY OF PRESENT ILLNESS
[de-identified] : 33 year old female h/o PCOS presents for follow-up.    Has been having abdominal bloating, cramping and distention especially after eating at night.  Thought to be related to metformin but has stopped 4 weeks ago with no resolution of symptoms.  Denies any nausea, vomiting, diarrhea or constipation.  Also has been suffering from worsening headaches.  History of migraines in the past.  Has not seen neurologist in several years.  Was on Topamax at that time.  Denies any blurry vision, double vision, lightheaded or chest pain.   followed by endo. on metformin.  and PNV  , 1 child  employed -billing non-smoker

## 2024-03-27 NOTE — PHYSICAL EXAM
[Normal] : normal rate, regular rhythm, normal S1 and S2 and no murmur heard [No Edema] : there was no peripheral edema [Pedal Pulses Present] : the pedal pulses are present [No Extremity Clubbing/Cyanosis] : no extremity clubbing/cyanosis [Soft] : abdomen soft [Non Tender] : non-tender [Non-distended] : non-distended [No HSM] : no HSM [Normal Bowel Sounds] : normal bowel sounds [Normal Anterior Cervical Nodes] : no anterior cervical lymphadenopathy [Normal Posterior Cervical Nodes] : no posterior cervical lymphadenopathy [No CVA Tenderness] : no CVA  tenderness

## 2024-03-29 DIAGNOSIS — R14.0 ABDOMINAL DISTENSION (GASEOUS): ICD-10-CM

## 2024-03-29 LAB
ALBUMIN SERPL ELPH-MCNC: 4.6 G/DL
ALP BLD-CCNC: 100 U/L
ALT SERPL-CCNC: 22 U/L
ANION GAP SERPL CALC-SCNC: 13 MMOL/L
AST SERPL-CCNC: 16 U/L
BASOPHILS # BLD AUTO: 0.06 K/UL
BASOPHILS NFR BLD AUTO: 0.5 %
BILIRUB SERPL-MCNC: 0.2 MG/DL
BUN SERPL-MCNC: 13 MG/DL
CALCIUM SERPL-MCNC: 9.4 MG/DL
CHLORIDE SERPL-SCNC: 104 MMOL/L
CO2 SERPL-SCNC: 21 MMOL/L
CREAT SERPL-MCNC: 0.71 MG/DL
EGFR: 114 ML/MIN/1.73M2
EOSINOPHIL # BLD AUTO: 0.29 K/UL
EOSINOPHIL NFR BLD AUTO: 2.3 %
FSH SERPL-MCNC: 1.4 IU/L
GGT SERPL-CCNC: 28 U/L
GLIADIN IGA SER QL: 5.3 UNITS
GLIADIN IGG SER QL: <5 UNITS
GLIADIN PEPTIDE IGA SER-ACNC: NEGATIVE
GLIADIN PEPTIDE IGG SER-ACNC: NEGATIVE
HCT VFR BLD CALC: 40.5 %
HGB BLD-MCNC: 13.1 G/DL
IMM GRANULOCYTES NFR BLD AUTO: 0.3 %
LH SERPL-ACNC: 3.4 IU/L
LYMPHOCYTES # BLD AUTO: 3.5 K/UL
LYMPHOCYTES NFR BLD AUTO: 28 %
MAN DIFF?: NORMAL
MCHC RBC-ENTMCNC: 26.6 PG
MCHC RBC-ENTMCNC: 32.3 GM/DL
MCV RBC AUTO: 82.3 FL
MEV IGG FLD QL IA: 130 AU/ML
MEV IGG+IGM SER-IMP: POSITIVE
MONOCYTES # BLD AUTO: 0.76 K/UL
MONOCYTES NFR BLD AUTO: 6.1 %
MUV AB SER-ACNC: NEGATIVE
MUV IGG SER QL IA: 6.4 AU/ML
NEUTROPHILS # BLD AUTO: 7.86 K/UL
NEUTROPHILS NFR BLD AUTO: 62.8 %
PLATELET # BLD AUTO: 355 K/UL
POTASSIUM SERPL-SCNC: 4.1 MMOL/L
PROLACTIN SERPL-MCNC: 8.1 NG/ML
PROT SERPL-MCNC: 7.2 G/DL
RBC # BLD: 4.92 M/UL
RBC # FLD: 15.8 %
RUBV IGG FLD-ACNC: 1.7 INDEX
RUBV IGG SER-IMP: POSITIVE
SODIUM SERPL-SCNC: 139 MMOL/L
TESTOST SERPL-MCNC: 24.5 NG/DL
TTG IGA SER IA-ACNC: <1.2 U/ML
TTG IGA SER-ACNC: NEGATIVE
TTG IGG SER IA-ACNC: 5.4 U/ML
TTG IGG SER IA-ACNC: NEGATIVE
VZV AB TITR SER: POSITIVE
VZV IGG SER IF-ACNC: 1238 INDEX
WBC # FLD AUTO: 12.51 K/UL

## 2024-03-29 RX ORDER — METRONIDAZOLE 500 MG/1
500 TABLET ORAL 3 TIMES DAILY
Qty: 15 | Refills: 0 | Status: ACTIVE | COMMUNITY
Start: 2024-03-29 | End: 1900-01-01

## 2024-03-29 RX ORDER — CIPROFLOXACIN HYDROCHLORIDE 500 MG/1
500 TABLET, FILM COATED ORAL
Qty: 10 | Refills: 0 | Status: ACTIVE | COMMUNITY
Start: 2024-03-29 | End: 1900-01-01

## 2024-04-02 ENCOUNTER — OUTPATIENT (OUTPATIENT)
Dept: OUTPATIENT SERVICES | Facility: HOSPITAL | Age: 34
LOS: 1 days | End: 2024-04-02
Payer: COMMERCIAL

## 2024-04-02 ENCOUNTER — APPOINTMENT (OUTPATIENT)
Dept: ULTRASOUND IMAGING | Facility: IMAGING CENTER | Age: 34
End: 2024-04-02
Payer: COMMERCIAL

## 2024-04-02 DIAGNOSIS — R14.0 ABDOMINAL DISTENSION (GASEOUS): ICD-10-CM

## 2024-04-02 PROCEDURE — 76700 US EXAM ABDOM COMPLETE: CPT

## 2024-04-02 PROCEDURE — 76700 US EXAM ABDOM COMPLETE: CPT | Mod: 26

## 2024-04-03 ENCOUNTER — CLINICAL ADVICE (OUTPATIENT)
Age: 34
End: 2024-04-03

## 2024-04-03 DIAGNOSIS — K80.20 CALCULUS OF GALLBLADDER W/OUT CHOLECYSTITIS W/OUT OBSTRUCTION: ICD-10-CM

## 2024-04-11 ENCOUNTER — TRANSCRIPTION ENCOUNTER (OUTPATIENT)
Age: 34
End: 2024-04-11

## 2024-05-20 NOTE — DISCHARGE NOTE PROVIDER - NSDCDCMDCOMP_GEN_ALL_CORE
You can access the FollowMyHealth Patient Portal offered by [facility] by registering at the following website: [email or web address]. By joining "[employer]’s FollowMyHealth portal, you will also be able to view your health information using other applications (apps) compatible with our system.
This document is complete and the patient is ready for discharge.